# Patient Record
Sex: MALE | Race: BLACK OR AFRICAN AMERICAN | NOT HISPANIC OR LATINO | Employment: FULL TIME | URBAN - METROPOLITAN AREA
[De-identification: names, ages, dates, MRNs, and addresses within clinical notes are randomized per-mention and may not be internally consistent; named-entity substitution may affect disease eponyms.]

---

## 2022-03-29 ENCOUNTER — HISTORICAL (OUTPATIENT)
Dept: ADMINISTRATIVE | Facility: HOSPITAL | Age: 47
End: 2022-03-29

## 2022-03-29 ENCOUNTER — HOSPITAL ENCOUNTER (OUTPATIENT)
Dept: MEDSURG UNIT | Facility: HOSPITAL | Age: 47
End: 2022-03-31
Attending: INTERNAL MEDICINE | Admitting: INTERNAL MEDICINE

## 2022-03-29 LAB
ABS NEUT (OLG): 4.55 (ref 2.1–9.2)
ALBUMIN SERPL-MCNC: 4.2 G/DL (ref 3.5–5)
ALBUMIN/GLOB SERPL: 1 {RATIO} (ref 1.1–2)
ALP SERPL-CCNC: 71 U/L (ref 40–150)
ALT SERPL-CCNC: 37 U/L (ref 0–55)
APTT PPP: 24.1 S (ref 23.3–37)
AST SERPL-CCNC: 34 U/L (ref 5–34)
BASOPHILS # BLD AUTO: 0 10*3/UL (ref 0–0.2)
BASOPHILS NFR BLD AUTO: 0 %
BILIRUB SERPL-MCNC: 0.9 MG/DL
BILIRUBIN DIRECT+TOT PNL SERPL-MCNC: 0.4 (ref 0–0.5)
BILIRUBIN DIRECT+TOT PNL SERPL-MCNC: 0.5 (ref 0–0.8)
BUN SERPL-MCNC: 14.1 MG/DL (ref 8.9–20.6)
CALCIUM SERPL-MCNC: 9.7 MG/DL (ref 8.7–10.5)
CHLORIDE SERPL-SCNC: 101 MMOL/L (ref 98–107)
CO2 SERPL-SCNC: 23 MMOL/L (ref 22–29)
CREAT SERPL-MCNC: 1.27 MG/DL (ref 0.73–1.18)
EOSINOPHIL # BLD AUTO: 0 10*3/UL (ref 0–0.9)
EOSINOPHIL NFR BLD AUTO: 0 %
ERYTHROCYTE [DISTWIDTH] IN BLOOD BY AUTOMATED COUNT: 14.1 % (ref 11.5–14.5)
ETHANOL SERPL-MCNC: <10 MG/DL
FLAG2 (OHS): 80
FLAG3 (OHS): 90
FLAGS (OHS): 70
GLOBULIN SER-MCNC: 4.1 G/DL (ref 2.4–3.5)
GLUCOSE SERPL-MCNC: 169 MG/DL (ref 74–100)
HCT VFR BLD AUTO: 39.2 % (ref 40–51)
HGB BLD-MCNC: 13.3 G/DL (ref 13.5–17.5)
ICTERIC INTERF INDEX SERPL-ACNC: 1
IMM GRANULOCYTES # BLD AUTO: 0.01 10*3/UL
IMM GRANULOCYTES NFR BLD AUTO: 0 %
IMM. NE 2 SUSPECT FLAG (OHS): 20
INR PPP: 0.98 (ref 0.9–1.2)
LDH SERPL-CCNC: 244 U/L (ref 140–271)
LIPASE SERPL-CCNC: 479 U/L
LIPEMIC INTERF INDEX SERPL-ACNC: 3
LOW EVENT # SUSPECT FLAG (OHS): 70
LYMPHOCYTES # BLD AUTO: 1.7 10*3/UL (ref 0.6–4.6)
LYMPHOCYTES NFR BLD AUTO: 25 %
MAGNESIUM SERPL-MCNC: 2.3 MG/DL (ref 1.6–2.6)
MANUAL DIFF? (OHS): NO
MCH RBC QN AUTO: 32.4 PG (ref 26–34)
MCHC RBC AUTO-ENTMCNC: 33.9 G/DL (ref 31–37)
MCV RBC AUTO: 95.4 FL (ref 80–100)
MO BLASTS SUSPECT FLAG (OHS): 40
MONOCYTES # BLD AUTO: 0.6 10*3/UL (ref 0.1–1.3)
MONOCYTES NFR BLD AUTO: 9 %
NEUTROPHILS # BLD AUTO: 4.55 10*3/UL (ref 2.1–9.2)
NEUTROPHILS NFR BLD AUTO: 66 %
NRBC BLD AUTO-RTO: 0 % (ref 0–0.2)
PHOSPHATE SERPL-MCNC: 3.9 MG/DL (ref 2.3–4.7)
PLATELET # BLD AUTO: 178 10*3/UL (ref 130–400)
PLATELET CLUMPS SUSPECT FLAG (OHS): 40
PMV BLD AUTO: 10.5 FL (ref 7.4–10.4)
POTASSIUM SERPL-SCNC: 4.3 MMOL/L (ref 3.5–5.1)
PROT SERPL-MCNC: 8.3 G/DL (ref 6.4–8.3)
PROTHROMBIN TIME: 12.8 S (ref 11.9–14.4)
RBC # BLD AUTO: 4.11 10*6/UL (ref 4.5–5.9)
SODIUM SERPL-SCNC: 138 MMOL/L (ref 136–145)
WBC # SPEC AUTO: 6.9 10*3/UL (ref 4.5–11)

## 2022-03-30 LAB
ABS NEUT (OLG): 3.35 (ref 2.1–9.2)
ALBUMIN SERPL-MCNC: 3.4 G/DL (ref 3.5–5)
ALBUMIN/GLOB SERPL: 1 {RATIO} (ref 1.1–2)
ALP SERPL-CCNC: 57 U/L (ref 40–150)
ALT SERPL-CCNC: 27 U/L (ref 0–55)
AMPHET UR QL SCN: NEGATIVE
APPEARANCE, UA: CLEAR
AST SERPL-CCNC: 27 U/L (ref 5–34)
BACTERIA SPEC CULT: NORMAL
BARBITURATE SCN PRESENT UR: NEGATIVE
BASOPHILS # BLD AUTO: 0 10*3/UL (ref 0–0.2)
BASOPHILS NFR BLD AUTO: 0 %
BENZODIAZ UR QL SCN: NEGATIVE
BILIRUB SERPL-MCNC: 0.9 MG/DL
BILIRUB UR QL STRIP: NEGATIVE
BILIRUBIN DIRECT+TOT PNL SERPL-MCNC: 0.4 (ref 0–0.5)
BILIRUBIN DIRECT+TOT PNL SERPL-MCNC: 0.5 (ref 0–0.8)
BUN SERPL-MCNC: 11.5 MG/DL (ref 8.9–20.6)
CALCIUM SERPL-MCNC: 8.6 MG/DL (ref 8.7–10.5)
CANNABINOIDS UR QL SCN: POSITIVE
CHLORIDE SERPL-SCNC: 105 MMOL/L (ref 98–107)
CHOLEST SERPL-MCNC: 161 MG/DL
CHOLEST/HDLC SERPL: 3 {RATIO} (ref 0–5)
CO2 SERPL-SCNC: 24 MMOL/L (ref 22–29)
COCAINE UR QL SCN: NEGATIVE
COLOR UR: NORMAL
CREAT SERPL-MCNC: 0.87 MG/DL (ref 0.73–1.18)
EOSINOPHIL # BLD AUTO: 0.1 10*3/UL (ref 0–0.9)
EOSINOPHIL NFR BLD AUTO: 1 %
ERYTHROCYTE [DISTWIDTH] IN BLOOD BY AUTOMATED COUNT: 14.2 % (ref 11.5–14.5)
FENTANYL UR QL SCN: NEGATIVE
FLAG2 (OHS): 80
FLAG3 (OHS): 80
FLAGS (OHS): 70
GLOBULIN SER-MCNC: 3.3 G/DL (ref 2.4–3.5)
GLUCOSE (UA): NORMAL
GLUCOSE SERPL-MCNC: 107 MG/DL (ref 74–100)
HCT VFR BLD AUTO: 34.2 % (ref 40–51)
HDLC SERPL-MCNC: 56 MG/DL (ref 35–60)
HEMOLYSIS INTERF INDEX SERPL-ACNC: 6
HEMOLYSIS INTERF INDEX SERPL-ACNC: 9
HGB BLD-MCNC: 11.4 G/DL (ref 13.5–17.5)
HGB UR QL STRIP: NEGATIVE
HYALINE CASTS #/AREA URNS LPF: NORMAL /[LPF]
ICTERIC INTERF INDEX SERPL-ACNC: 1
IMM GRANULOCYTES # BLD AUTO: 0.01 10*3/UL
IMM GRANULOCYTES NFR BLD AUTO: 0 %
IMM. NE 1 SUSPECT FLAG (OHS): 10
IMM. NE 2 SUSPECT FLAG (OHS): 20
KETONES UR QL STRIP: NEGATIVE
LDLC SERPL CALC-MCNC: 89 MG/DL (ref 50–140)
LEUKOCYTE ESTERASE UR QL STRIP: NEGATIVE
LOW EVENT # SUSPECT FLAG (OHS): 70
LYMPHOCYTES # BLD AUTO: 2.4 10*3/UL (ref 0.6–4.6)
LYMPHOCYTES NFR BLD AUTO: 37 %
MAGNESIUM SERPL-MCNC: 2.2 MG/DL (ref 1.6–2.6)
MANUAL DIFF? (OHS): NO
MCH RBC QN AUTO: 32.8 PG (ref 26–34)
MCHC RBC AUTO-ENTMCNC: 33.3 G/DL (ref 31–37)
MCV RBC AUTO: 98.3 FL (ref 80–100)
MDMA UR QL SCN: NEGATIVE
MO BLASTS SUSPECT FLAG (OHS): 40
MONOCYTES # BLD AUTO: 0.6 10*3/UL (ref 0.1–1.3)
MONOCYTES NFR BLD AUTO: 10 %
MUCOUS THREADS URNS QL MICRO: NORMAL
NEUTROPHILS # BLD AUTO: 3.35 10*3/UL (ref 2.1–9.2)
NEUTROPHILS NFR BLD AUTO: 52 %
NITRITE UR QL STRIP: NEGATIVE
NRBC BLD AUTO-RTO: 0 % (ref 0–0.2)
OPIATES UR QL SCN: POSITIVE
PCP UR QL: NEGATIVE
PH UR STRIP.AUTO: 6.5 [PH] (ref 3–11)
PH UR STRIP: 5.5 [PH] (ref 4.5–8)
PHOSPHATE SERPL-MCNC: 3.6 MG/DL (ref 2.3–4.7)
PLATELET # BLD AUTO: 151 10*3/UL (ref 130–400)
PLATELET CLUMPS SUSPECT FLAG (OHS): 20
PMV BLD AUTO: 10.8 FL (ref 7.4–10.4)
POTASSIUM SERPL-SCNC: 3.8 MMOL/L (ref 3.5–5.1)
PROT SERPL-MCNC: 6.7 G/DL (ref 6.4–8.3)
PROT UR QL STRIP: NORMAL
RBC # BLD AUTO: 3.48 10*6/UL (ref 4.5–5.9)
RBC #/AREA URNS HPF: NORMAL /[HPF]
SODIUM SERPL-SCNC: 139 MMOL/L (ref 136–145)
SP GR UR STRIP: 1.01 (ref 1–1.03)
SQUAMOUS EPITHELIAL, UA: NORMAL
TRIGL SERPL-MCNC: 78 MG/DL (ref 34–140)
UROBILINOGEN UR STRIP-ACNC: NORMAL
VLDLC SERPL CALC-MCNC: 16 MG/DL
WBC # SPEC AUTO: 6.5 10*3/UL (ref 4.5–11)
WBC #/AREA URNS HPF: NORMAL /[HPF] (ref 0–5)

## 2022-03-31 LAB
ABS NEUT (OLG): 3.61 (ref 2.1–9.2)
ALBUMIN SERPL-MCNC: 3.4 G/DL (ref 3.5–5)
ALBUMIN/GLOB SERPL: 1 {RATIO} (ref 1.1–2)
ALP SERPL-CCNC: 61 U/L (ref 40–150)
ALT SERPL-CCNC: 23 U/L (ref 0–55)
AST SERPL-CCNC: 25 U/L (ref 5–34)
BASOPHILS # BLD AUTO: 0 10*3/UL (ref 0–0.2)
BASOPHILS NFR BLD AUTO: 0 %
BILIRUB SERPL-MCNC: 1 MG/DL
BILIRUBIN DIRECT+TOT PNL SERPL-MCNC: 0.4 (ref 0–0.5)
BILIRUBIN DIRECT+TOT PNL SERPL-MCNC: 0.6 (ref 0–0.8)
BUN SERPL-MCNC: 4.2 MG/DL (ref 8.9–20.6)
CALCIUM SERPL-MCNC: 9.3 MG/DL (ref 8.7–10.5)
CHLORIDE SERPL-SCNC: 102 MMOL/L (ref 98–107)
CO2 SERPL-SCNC: 27 MMOL/L (ref 22–29)
CREAT SERPL-MCNC: 0.76 MG/DL (ref 0.73–1.18)
EOSINOPHIL # BLD AUTO: 0.1 10*3/UL (ref 0–0.9)
EOSINOPHIL NFR BLD AUTO: 2 %
ERYTHROCYTE [DISTWIDTH] IN BLOOD BY AUTOMATED COUNT: 13.7 % (ref 11.5–14.5)
FERRITIN SERPL-MCNC: 340.48 NG/ML (ref 21.81–274.66)
FLAG2 (OHS): 80
FLAG3 (OHS): 80
FLAGS (OHS): 70
GLOBULIN SER-MCNC: 3.5 G/DL (ref 2.4–3.5)
GLUCOSE SERPL-MCNC: 95 MG/DL (ref 74–100)
HCT VFR BLD AUTO: 35.5 % (ref 40–51)
HEMOLYSIS INTERF INDEX SERPL-ACNC: 2
HGB BLD-MCNC: 11.9 G/DL (ref 13.5–17.5)
ICTERIC INTERF INDEX SERPL-ACNC: 1
IMM GRANULOCYTES # BLD AUTO: 0.01 10*3/UL
IMM GRANULOCYTES NFR BLD AUTO: 0 %
IMM. NE 1 SUSPECT FLAG (OHS): 10
IMM. NE 2 SUSPECT FLAG (OHS): 10
IRON SATN MFR SERPL: 28 % (ref 20–50)
IRON SERPL-MCNC: 55 UG/DL (ref 65–175)
LOW EVENT # SUSPECT FLAG (OHS): 70
LYMPHOCYTES # BLD AUTO: 2.4 10*3/UL (ref 0.6–4.6)
LYMPHOCYTES NFR BLD AUTO: 35 %
MANUAL DIFF? (OHS): NO
MCH RBC QN AUTO: 32.4 PG (ref 26–34)
MCHC RBC AUTO-ENTMCNC: 33.5 G/DL (ref 31–37)
MCV RBC AUTO: 96.7 FL (ref 80–100)
MO BLASTS SUSPECT FLAG (OHS): 40
MONOCYTES # BLD AUTO: 0.7 10*3/UL (ref 0.1–1.3)
MONOCYTES NFR BLD AUTO: 10 %
NEUTROPHILS # BLD AUTO: 3.61 10*3/UL (ref 2.1–9.2)
NEUTROPHILS NFR BLD AUTO: 52 %
NRBC BLD AUTO-RTO: 0 % (ref 0–0.2)
PLATELET # BLD AUTO: 156 10*3/UL (ref 130–400)
PLATELET CLUMPS SUSPECT FLAG (OHS): 10
PMV BLD AUTO: 11.1 FL (ref 7.4–10.4)
POTASSIUM SERPL-SCNC: 3.6 MMOL/L (ref 3.5–5.1)
PROT SERPL-MCNC: 6.9 G/DL (ref 6.4–8.3)
RBC # BLD AUTO: 3.67 10*6/UL (ref 4.5–5.9)
SODIUM SERPL-SCNC: 140 MMOL/L (ref 136–145)
TIBC SERPL-MCNC: 144 UG/DL (ref 69–240)
TIBC SERPL-MCNC: 199 UG/DL (ref 250–450)
TRANSFERRIN SERPL-MCNC: 177 MG/DL (ref 174–364)
WBC # SPEC AUTO: 6.9 10*3/UL (ref 4.5–11)

## 2022-04-10 ENCOUNTER — HISTORICAL (OUTPATIENT)
Dept: ADMINISTRATIVE | Facility: HOSPITAL | Age: 47
End: 2022-04-10

## 2022-04-25 VITALS — DIASTOLIC BLOOD PRESSURE: 81 MMHG | OXYGEN SATURATION: 100 % | WEIGHT: 159.38 LBS | SYSTOLIC BLOOD PRESSURE: 129 MMHG

## 2022-05-14 NOTE — ED PROVIDER NOTES
Patient:   Herminio Graves            MRN: [id number]            FIN: [id number]               Age:   46 years     Sex:  Male     :  1975   Associated Diagnoses:   Chronic alcoholic pancreatitis   Author:   Igor Paris MD      Basic Information   Time seen: Immediately upon arrival.   History source: Patient.   Arrival mode: Private vehicle.   History limitation: None.   Provider/Visit info:   Time Seen:  Igor Paris MD / 2022 22:13  .   History of Present Illness   The patient presents with abdominal pain.  The onset was 2  days ago.  The course/duration of symptoms is constant.  The character of symptoms is achy.  The degree at onset was minimal.  The Location of pain at onset was diffuse, upper and abdominal.  The degree at present is severe.  The Location of pain at present is diffuse, upper and abdominal.  Radiating pain: none. The exacerbating factor is Alcohol use.  The relieving factor is none.  Therapy today: none.  Risk factors consist of ETOH abuse, recurrent pancreatitis.  Associated symptoms: nausea, vomiting, denies chest pain, denies diarrhea, denies shortness of breath and denies fever.  Additional history: sexual history: non-contributory.        Review of Systems   Constitutional symptoms:  No fever, no chills, no sweats, no weakness.    Skin symptoms:  No rash, no lesion.    Eye symptoms:  No recent vision problems,    Respiratory symptoms:  No shortness of breath, no orthopnea, no cough, no sputum production.    Cardiovascular symptoms:  No chest pain, no palpitations, no tachycardia, no syncope, no diaphoresis, no peripheral edema.    Gastrointestinal symptoms:  Constipation, no diarrhea, no rectal bleeding.    Genitourinary symptoms:  No dysuria, no hematuria, no testicular pain.    Musculoskeletal symptoms:  No back pain, no Joint pain.    Neurologic symptoms:  No headache, no dizziness.    Psychiatric symptoms:  Substance abuse.    Allergy/immunologic symptoms:  Negative except as documented in HPI.             Additional review of systems information: All other systems reviewed and otherwise negative.      Health Status   Allergies:    Allergic Reactions (Selected)  No Known Allergies,    Allergies (1) Active Reaction  No Known Allergies None Documented  .   Medications:  (Selected)   Inpatient Medications  Ordered  NS (0.9% Sodium Chloride) Infusion: 1,000 mL, 1,000 mL, IV, Once, 500 mL/hr, start date 03/29/22 22:19:00 CDT, stop date 03/29/22 22:19:00 CDT, STAT  Pepcid (for IV Push): 20 mg, form: Injection, IV Slow, Once, first dose 03/29/22 23:00:00 CDT, stop date 03/29/22 23:00:00 CDT  morphine 4 mg/mL preservative-free intravenous solution: 4 mg, form: Injection, IV Push, Once-NOW PRN for pain, severe, first dose 03/29/22 22:20:00 CDT, STAT, ( > 7 on pain scale).      Past Medical/ Family/ Social History   Medical history:    No active or resolved past medical history items have been selected or recorded., Reviewed as documented in chart.   Surgical history:    hernia repair..   Family history:    No family history items have been selected or recorded..   Social history:    Social & Psychosocial Habits    Alcohol  07/05/2016  Use: Current    Type: Beer    Frequency: 1-2 times per week    Substance Use  09/10/2021  Use: Current    Type: Marijuana    Frequency: 3-5 times per week    Tobacco  09/22/2021  Use: 10 or more cigarettes (1/    Patient Wants Consult For Cessation Counseling No    Abuse/Neglect  09/10/2021  SHX Any signs of abuse or neglect No    Feels unsafe at home: No    Safe place to go: Yes  , Alcohol use: Regularly, Tobacco use: Regularly, Drug use: Marijuana.   Problem list:    Active Problems (5)  Deficient knowledge of symptom   Knowledge deficit   Malnutrition   No chronic problems   Tobacco user   .      Physical Examination               Vital Signs      No qualifying data available.   Oxygen saturation.   General:   Alert, mild distress.    Skin:  Warm, dry, pink, intact, no pallor, no rash.    Head:  Normocephalic, atraumatic.    Neck:  Supple, trachea midline, no JVD, no carotid bruit.    Eye:  Pupils are equal, round and reactive to light, extraocular movements are intact, normal conjunctiva.    Ears, nose, mouth and throat:  Oral mucosa moist.   Cardiovascular:  Regular rate and rhythm, No murmur, Normal peripheral perfusion, No edema.    Respiratory:  Lungs are clear to auscultation, respirations are non-labored, breath sounds are equal, Symmetrical chest wall expansion.    Gastrointestinal:  Soft, Non distended, No organomegaly, Tenderness: Mild, generalized, epigastric, Guarding: Negative, Rebound: Negative, Bowel sounds: Normal.    Back:  Nontender, Normal range of motion.    Musculoskeletal:  Normal ROM, normal strength, no swelling.    Neurological:  Alert and oriented to person, place, time, and situation, No focal neurological deficit observed, normal motor observed, normal speech observed, normal coordination observed.    Psychiatric:  Cooperative, appropriate mood & affect.       Medical Decision Making   Rationale:  Irvin Criteria    Age > 55:  WBC > 16,000:  Glu > 200:  LDH > 350:  AST > 250:    Total: 0.   Documents reviewed:  Emergency department nurses' notes, emergency department records, prior records, IMPRESSION:  1. Findings consistent with acute edematous pancreatitis. No definite  pancreatic parenchymal perfusion defects to suggest necrosis. No acute  peripancreatic fluid collections. No peripancreatic ductal dilatation.     2. Reactive wall thickening and inflammation of the duodenum  compatible with secondary duodenitis.     3. Circumferential wall thickening of the moderately distended urinary  bladder, greater than expected for the degree of underdistention.  Correlation with urinalysis is suggested for exclusion of superimposed  cystitis.     4. Additional nonacute and incidental secondary  findings, as detailed  above.       Signature Line  Electronically Signed By: Raul Alexander MD  Date/Time Signed: 09/10/2021 15:30.    Electrocardiogram:  Time 3/29/2022 23:09:00, rate 78, normal sinus rhythm, No ST-T changes, no ectopy, normal NV & QRS intervals, EP Interp.    Results review:  Lab results : [facility]   3/29/2022 22:29 CDT      WBC                       6.9 x10(3)/mcL                             Hgb                       13.3 gm/dL  LOW                             Hct                       39.2 %  LOW                             Platelet                  178 x10(3)/mcL                             Neutro Auto               66 %  NA                             Lymph Auto                25 %  NA                             Mono Auto                 9 %  NA  , Lab results : [facility]   3/29/2022 22:29 CDT      LDH                       244 unit/L  , Lab results : [facility]   3/29/2022 22:29 CDT      Sodium Lvl                138 mmol/L                             Potassium Lvl             4.3 mmol/L                             Chloride                  101 mmol/L                             CO2                       23 mmol/L                             Calcium Lvl               9.7 mg/dL                             Magnesium Lvl             2.30 mg/dL                             Glucose Lvl               169 mg/dL  HI                             BUN                       14.1 mg/dL                             Creatinine                1.27 mg/dL  HI                             Bili Total                0.9 mg/dL                             AST                       34 unit/L                             ALT                       37 unit/L                             Alk Phos                  71 unit/L                             LDH                       244 unit/L                             Lipase Lvl                479 U/L  HI                             Ethanol Lvl               <10.0 mg/dL  NA   ,    No qualifying data available.    Chest X-Ray:  Time reported 3/29/2022 23:49:00, no acute disease process, interpretation by Emergency Physician.    Abdominal/KUB X-ray  Nonspecific bowel gas pattern, interpretation by Emergency Physician.       Reexamination/ Reevaluation   Time: 3/29/2022 23:55:00 .   Assessment: exam unchanged.      Impression and Plan   Diagnosis   Chronic alcoholic pancreatitis ([id number] K86.0)      Calls-Consults   -  3/29/2022 23:56:00 , Medicine, consult.    Plan   Condition: Stable.    Disposition: Admit time  3/29/2022 23:56:00, Place in Observation Unit.    Counseled: Patient, Regarding diagnosis, Regarding diagnostic results, Regarding treatment plan, Patient indicated understanding of instructions.

## 2022-05-14 NOTE — DISCHARGE SUMMARY
Admit and Discharge Dates  Admit Date: 03/29/2022  Discharge Date: 03/31/2022  Physicians  Attending Physician - Ananya Hood MD  Admitting Physician - Ananya Hood MD  Primary Care Physician - No PCP, No  Discharge Diagnosis  Acute on chronic pancreatitis  Elevated BP  ETOH use  Tobacco use  Anemia  Surgical Procedures  No procedures recorded for this visit.  Immunizations  No immunizations recorded for this visit.  Admission Information  46-year-old male with a history of significant past medical history who presents to the ED with complaint of?nausea, vomiting, and abdominal pain for 3 days. ?Patient describes his?abdominal pain as pulling?pain in epigastric region, alleviated by nothing, aggravated by food, associated with?nausea?and nonbloody nonbilious vomiting?and weakness. ?Patient states that this weekend he had a party?where he had a few beers. ?Patient states he had?a previous episode about a month ago. ?Of note?patient was admitted at [facility]?for acute pancreatitis and?9/2021.? She normally drinks about 3?to 4 16 ounce beers?daily?for the past 3 years. ED course: On initial presentation vital signs are stable.? Awaiting the ED he became hypertensive with systolics up to 180 and diastolic up to 112.? Notable labs glucose 169, BUN 14.1, creatinine 1.27, base 479, H/H 13.3/39.2, MCV 95.4.? Urinalysis unremarkable.? UDS positive for cannabis and opiates.? EKG unremarkable.? He received a dose of Toradol and morphine.? Internal medicine was consulted for acute pancreatitis.  Hospital Course  This patient initially presented to the ED on 3/30/2022 d/t N/V and abdominal pain x3days. CXR and abdominal Xray were normal at that time; he was given aggressive IVF and pain meds. Thiamine, multivitamin and CIWA protocol were also started d/t his HX of alcoholism.?On 3/31/2022, he was tolerating PO intake and stated that his pain had reduced significantly. Patient was stable to be discharged. Discharge plan is as follows:  [facility] with Dr. Efren Ozuna in 1 week.  Significant Findings  3/29/2022 - CXR WNL  3/29/2022 - Abdominal Xray shows non-obstructed bowel gas  3/30/2022 - Retroperitoneum U/S shows no gallstones,?mild extrahepatic biliary ductal dilatation,?suspect hepatic steatosis.  Time Spent on discharge  ~35 minutes  Objective  Vitals & Measurements  T:?36.9? ?C (Oral)? TMIN:?36.8? ?C (Oral)? TMAX:?37.2? ?C (Oral)? HR:?81(Peripheral)? RR:?18? BP:?130/83? SpO2:?98%?  Physical Exam  Please refer to progress note  Patient Discharge Condition  Hemodynamically and physically stable  Discharge Disposition  Mr.Corey Graves?is being discharged?home.  ?   Activity:?Return to normal activity. Recommend returning to?workin _?days.  Diet:?Regular Diet  ?  Medications:  -Rx provided for?Folic acid 1mg PO daily?for?30 days?  -Rx provided for Thiamine 100mg PO daily for 30 days  ?   Follow Ups:  -To be seen in [facility] with [facility]?by ?Efren Ozuna?in?1 week  ?  The above information was discussed with?the patient?in clear terms.?He?was?able to repeat the instructions to me in?hisown words. All questions answered. ED precautions provided.?  ?   Attending Physician Addendum  Pt seen and Discussed with medicine residents on morning rounds  Resting this morning  Electrolytes improving steadily  Agree with above discharge plan   Discharge Medication Reconciliation  Prescribed  folic acid (folic acid 1 mg oral tablet)?1 mg, Oral, Daily  thiamine (thiamine 100 mg oral tablet)?100 mg, Oral, Daily  Education and Orders Provided  Acute Pancreatitis, Easy-to-Read  Pancreatitis Eating Plan  Discharge - 03/31/22 11:20:00 CDT, Home, Discharge after meds to bed. Thank you!?  Follow up  [facility]  ????Office will call w/follow up appointment

## 2022-05-21 NOTE — HISTORICAL [facility] CERNER
This is a historical note converted from Scar. Formatting and pictures may have been removed.  Please reference Scar for original formatting and attached multimedia. Chief Complaint  Pt presents to ed with reports of ULQ abd pain starting earlier today. Pt reports pancreatitis in the past states he believes this is a flare-up.  History of Present Illness  46-year-old male with a history of significant past medical history who presents to the ED with complaint of?nausea, vomiting, and abdominal pain for 3 days. ?Patient describes his?abdominal pain as pulling?pain in epigastric region, alleviated by nothing, aggravated by food, associated with?nausea?and nonbloody nonbilious vomiting?and weakness. ?Patient states that this weekend he had a party?where he had a few beers. ?Patient states he had?a previous episode about a month ago. ?Of note?patient was admitted at [facility]?for acute pancreatitis and?2021.? She normally drinks about 3?to 4 16 ounce beers?daily?for the past 3 years.  ?  ED course: On initial presentation vital signs are stable.? Awaiting the ED he became hypertensive with systolics up to 180 and diastolic up to 112.? Notable labs glucose 169, BUN 14.1, creatinine 1.27, base 479, H/H 13.3/39.2, MCV 95.4.? Urinalysis unremarkable.? UDS positive for cannabis and opiates.? EKG unremarkable.? He received a dose of Toradol and morphine.? Internal medicine was consulted for acute pancreatitis.  ?  PMHx: Denies  PSurgHx: Hernia repair  Family Hx: Father  from pancreatitis cancer, Aunts  from breast cancer, Paternal grandmother  from pancreatic cancer  Social Hx:  ????? Alcohol:?Stopped for about a month. 3-4 beers a day x 3 years  ????? Smoking:?Every blue moon 2-3 cigs/ day  ????? Drug use:?denies  ????? Lives with: with girlfriend and her mother in [place]  Allergies: NKDA  Medications: None  Review of Systems  Constitutional:?No fever, chills  HENT:?Denies headaches,  lightheadedness  Eyes:?Denies vision changes  Cardiovascular:?Denies chest pain,?palpitations  Respiratory:?Denies cough, shortness of breath, pain with breathing  :?Denies?dysuria,?urinary frequency,?urinary hesitancy, foul-smelling urine  MSK:?Denies weakness, pain  Skin:?Denies rashes, trauma  Neuro:?Denies numbness, tingling, focal deficits  Heme:?Denies bleeding?in?urine, stool, from any other sources  Physical Exam  Vitals & Measurements  T:?37.1? ?C (Oral)? TMIN:?36.6? ?C (Oral)? TMAX:?37.1? ?C (Oral)? HR:?85(Peripheral)? HR:?85(Monitored)? RR:?20? BP:?157/99? SpO2:?100%?  General: ?Alert and oriented, No acute distress.??  Appearance:? wearing face mask.  HEENT: ?Normocephalic, dry mucous membranes.?Pupils are equal, round and reactive to light, Extraocular movements are intact  Neck: ?Supple, Non-tender, No lymphadenopathy, No thyromegaly.??  Respiratory: ?Lungs are clear to auscultation, Respirations are non-labored, Breath sounds are equal, Symmetrical chest wall expansion.??  Cardiovascular: ?Normal rate, Regular rhythm, No edema, brisk capillary refill.??  Gastrointestinal:Soft, nondistended,?diffuse?abdominal tenderness, voluntary guarding,?no rebound.??  Musculoskeletal: Symmetric?movement of all extremities with no weakness appreciated  Integumentary: ?Warm, dry, intact.??  Neurologic:? No focal deficits, Cranial Nerves II-XII are grossly intact.??  Cognition and Speech: ?Oriented, Speech clear and coherent.??  Psychiatric: ?Cooperative, Appropriate mood & affect.??  Assessment/Plan  Acute on Chronic?Pancreatitis secondary to alcohol intake  Ransons criteria 0  Lipase >5x the upper limit of normal at 479  Abdominal US pending  Received IL NS in the ED. Will continue IVF with?LR at 250ml/hr  Acetaminophen?at 1000 mg?q6h prn pain?and?morphine?2mg q2h prn for severe pain  If patient fails to improve, consider CT Abdomen and Pelvis  ?  GENOVEVA  Patient appears to be dry  Expect to improve with IV  hydration  Avoid nephrotoxic medications  Follow up CMP in am  ?  Elevated BP  Patient denies history of HTN  Most likely secondary to pain  Will control with Hydralazine and Labetalol PRN  Continue to monitor  ?   Alcohol use disorder  [facility] protocol initiated  Thiamine, Folate, MVI  Monitor for signs of withdrawal  ?   ?Polysubstance abuse  UDS collected after patient received Morphine in ED?  Admits to marijuana use  ?   Normocytic Anemia  Steady decline in H/H?since 2021  Patient denies any signs of bleeding  This could be as a result of?alcohol use  Consider Iron?panel  ?  Dispo:?Admit to Telemetry as observation for IVF and pain control  ?  DVT Ppx: Heparin SQ  GI Ppx: None  Lines:?PIV  Drains:?None  Diet:?NPO  Consults:?None  ?   Enriqueta Najera MD  [facility], [facility]  ---------------------------------------------------------------  PGY II?resident addendum.  This is a 46-year-old male with past medical history as delineated above?who comes in complaining of nausea, vomiting, abdominal pain x3 days.? Pain localized to the epigastric region with no alleviating factors.? Rest of history and course as delineated above.  Patient seen and examined with intern, including the formulation of the assessment and plan.? All lab findings, images and chart documentation were reviewed.?  Agree with above assessment and plan.?  -Patient admitted?for recurrent?pancreatitis.? Will keep n.p.o. for now?and continue IV hydration. ?Advance diet as tolerated.? UDS pending. ?Continue to monitor kidney functions?and clinical picture.   Problem List/Past Medical History  Ongoing  No chronic problems  Tobacco user  Historical  No qualifying data  Procedure/Surgical History  hernia repair   Medications  Inpatient  chlordiazePOXIDE, 25 mg= 1 cap(s), Oral, q2hr, PRN  folic acid 1 mg oral tablet, 1 mg= 1 tab(s), Oral, Daily  heparin, 5000 units= 1 mL, Subcutaneous, q12hr  hydrALAZINE (Apresoline) Inj., 10 mg= 0.5 mL, IV Push,  q2hr, PRN  labetalol 5 mg/mL intravenous solution, 10 mg= 2 mL, IV Push, q2hr, PRN  Lactated Ringers 1000ml 1,000 mL, 1000 mL, IV  LORAzepam, 1 mg= 0.5 mL, IV Push, q2hr, PRN  LORAzepam, 2 mg= 1 mL, IV Push, q1hr, PRN  morphine 2 mg/mL preservative-free intravenous solution, 2 mg= 1 mL, IV, q2hr, PRN  MVI with Minerals (Adult Tab), 1 tab(s), Oral, Daily  thiamine, 100 mg= 1 tab(s), Oral, Daily  Home  No active home medications  Allergies  No Known Allergies  Social History  Abuse/Neglect  No, No, Yes, 03/29/2022  No, No, Yes, 09/10/2021  Alcohol  Current, Beer, 1-2 times per week, 07/05/2016  Substance Use  Current, Marijuana, 3-5 times per week, 09/10/2021  Tobacco  10 or more cigarettes (1/2 pack or more)/day in last 30 days, No, 03/29/2022  10 or more cigarettes (1/2 pack or more)/day in last 30 days, No, 09/22/2021  Lab Results  Labs Last 24 Hours?  ?Chemistry? Hematology/Coagulation?   Sodium Lvl: 139 mmol/L (03/30/22 05:07:00) WBC: 6.5 x10(3)/mcL (03/30/22 05:07:00)   Potassium Lvl: 3.8 mmol/L (03/30/22 05:07:00) RBC:?3.48 x10(6)/mcL?Low (03/30/22 05:07:00)   Chloride: 105 mmol/L (03/30/22 05:07:00) Hgb:?11.4 gm/dL?Low (03/30/22 05:07:00)   CO2: 24 mmol/L (03/30/22 05:07:00) Hct:?34.2 %?Low (03/30/22 05:07:00)   Calcium Lvl:?8.6 mg/dL?Low (03/30/22 05:07:00) Platelet: 151 x10(3)/mcL (03/30/22 05:07:00)   Magnesium Lvl: 2.3 mg/dL (03/29/22 22:29:00) MCV: 98.3 fL (03/30/22 05:07:00)   Glucose Lvl:?107 mg/dL?High (03/30/22 05:07:00) MCH: 32.8 pg (03/30/22 05:07:00)   BUN: 11.5 mg/dL (03/30/22 05:07:00) MCHC: 33.3 gm/dL (03/30/22 05:07:00)   Creatinine: 0.87 mg/dL (03/30/22 05:07:00) RDW: 14.2 % (03/30/22 05:07:00)   Est Creat Clearance Ser: 105.49 mL/min (03/30/22 06:48:47) MPV:?10.8 fL?High (03/30/22 05:07:00)   eGFR-AA: >105 (03/30/22 05:07:00) Abs Neut: 3.35 x10(3)/mcL (03/30/22 05:07:00)   eGFR-JAZZY: 101 mL/min/1.73 m2 (03/30/22 05:07:00) Neutro Auto: 52 % (03/30/22 05:07:00)   Bili Total: 0.9 mg/dL (03/30/22  05:07:00) Lymph Auto: 37 % (03/30/22 05:07:00)   Bili Direct: 0.4 mg/dL (03/30/22 05:07:00) Mono Auto: 10 % (03/30/22 05:07:00)   Bili Indirect: 0.5 mg/dL (03/30/22 05:07:00) Eos Auto: 1 % (03/30/22 05:07:00)   AST: 27 unit/L (03/30/22 05:07:00) Abs Eos: 0.1 x10(3)/mcL (03/30/22 05:07:00)   ALT: 27 unit/L (03/30/22 05:07:00) Basophil Auto: 0 % (03/30/22 05:07:00)   Alk Phos: 57 unit/L (03/30/22 05:07:00) Abs Neutro: 3.35 x10(3)/mcL (03/30/22 05:07:00)   Total Protein: 6.7 gm/dL (03/30/22 05:07:00) Abs Lymph: 2.4 x10(3)/mcL (03/30/22 05:07:00)   Albumin Lvl:?3.4 gm/dL?Low (03/30/22 05:07:00) Abs Mono: 0.6 x10(3)/mcL (03/30/22 05:07:00)   Globulin: 3.3 gm/dL (03/30/22 05:07:00) Abs Baso: 0 x10(3)/mcL (03/30/22 05:07:00)   A/G Ratio:?1 ratio?Low (03/30/22 05:07:00) NRBC%: 0.0 (03/30/22 05:07:00)   Phosphorus: 3.9 mg/dL (03/29/22 22:29:00) IG%: 0 % (03/30/22 05:07:00)   LDH: 244 unit/L (03/29/22 22:29:00) IG#: 0.01 (03/30/22 05:07:00)   Lipase Lvl:?479 U/L?High (03/29/22 22:29:00) PT: 12.8 second(s) (03/29/22 22:29:00)   U pH: 6.5 (03/30/22 00:02:00) INR: 0.98 (03/29/22 22:29:00)    PTT: 24.1 second(s) (03/29/22 22:29:00)

## 2022-05-21 NOTE — HISTORICAL [facility]ER
This is a historical note converted from [employer]. Formatting and pictures may have been removed.  Please reference Scar for original formatting and attached multimedia. Subjective  HPI: Herminio Graves?is a?46 Years?yo?Male?w/ PMH of pancreatitis, who presented?to ED?with CC?of abdominal pain x3 days and N/V x 1 day. Patient states he has been having ongoing intermittent?abdominal pain since December 2021 after he started drinking alcohol again (3-4 16oz beers/day)?but the pain became progressively worsened over the past 3 days. The pain is localized in the epigastric area and radiating to his back; it is aggravated by physical movement and nothing seems to alleviate the pain; he had tried OTC Tylenol w/o any relief. Last alcoholic drink was yesterday afternoon. Other accompanying SXS include N/V x 1 day which had subsided this AM. Of note, he was hospitalized in September 2021 for pancreatitis. In the ED: Ethanol level < 10; UDS (+) for opiates and cannabis; lipase 479; patient was hypertensive. NS 1L bolus given; LR @ 250 mL/hr, PRN morphine, PRN labetalol, PRN hydralazine?started.  ?   Today: Patient states epigastric pain is now 3 out of 10, improved significantly compared to the day of admission. He is tolerating PO intake w/o N/V. Denies chest pain, palpitations, SOB, fever, night sweats, chills,?diarrhea, constipation.  Review of Systems  10 point review of systems assessed and are negative except as stated above  Objective  Vitals & Measurements  T:?36.8? ?C (Oral)? TMIN:?36.8? ?C (Oral)? TMAX:?37.2? ?C (Oral)? HR:?63(Peripheral)? BP:?156/89? SpO2:?100%?  Physical Exam  General:?Well nourished w/o distress  HEENT: NC/AT; nasal and oral mucosa moist and clear  Neck: Full?ROM; no lymphadenopathy  Pulm:?CTA bilaterally, normal work of breathing  CV:?S1, S2?w/o murmurs or gallops; no edema noted  GI:?Soft?with normal bowel sounds in all quadrants,?no masses?on palpation, no?guarding  MSK:?Full ROM of all  extremities and spine w/o limitation or discomfort  Derm: No rashes,?abnormal bruising,?or skin lesions  Neuro: AAOx3; motor/sensory function intact  Psych: Cooperative; appropriate mood and affect  Lab Results  Labs Last 24 Hours?  ?Chemistry? Hematology/Coagulation?   Sodium Lvl: 140 mmol/L (03/31/22 05:07:00) WBC: 6.9 x10(3)/mcL (03/31/22 05:07:00)   Potassium Lvl: 3.6 mmol/L (03/31/22 05:07:00) RBC:?3.67 x10(6)/mcL?Low (03/31/22 05:07:00)   Chloride: 102 mmol/L (03/31/22 05:07:00) Hgb:?11.9 gm/dL?Low (03/31/22 05:07:00)   CO2: 27 mmol/L (03/31/22 05:07:00) Hct:?35.5 %?Low (03/31/22 05:07:00)   Calcium Lvl: 9.3 mg/dL (03/31/22 05:07:00) Platelet: 156 x10(3)/mcL (03/31/22 05:07:00)   Glucose Lvl: 95 mg/dL (03/31/22 05:07:00) MCV: 96.7 fL (03/31/22 05:07:00)   BUN:?4.2 mg/dL?Low (03/31/22 05:07:00) MCH: 32.4 pg (03/31/22 05:07:00)   Creatinine: 0.76 mg/dL (03/31/22 05:07:00) MCHC: 33.5 gm/dL (03/31/22 05:07:00)   Est Creat Clearance Ser: 120.76 mL/min (03/31/22 06:39:57) RDW: 13.7 % (03/31/22 05:07:00)   eGFR-AA: >105 (03/31/22 05:07:00) MPV:?11.1 fL?High (03/31/22 05:07:00)   eGFR-JAZZY: >105 (03/31/22 05:07:00) Abs Neut: 3.61 x10(3)/mcL (03/31/22 05:07:00)   Bili Total: 1 mg/dL (03/31/22 05:07:00) Neutro Auto: 52 % (03/31/22 05:07:00)   Bili Direct: 0.4 mg/dL (03/31/22 05:07:00) Lymph Auto: 35 % (03/31/22 05:07:00)   Bili Indirect: 0.6 mg/dL (03/31/22 05:07:00) Mono Auto: 10 % (03/31/22 05:07:00)   AST: 25 unit/L (03/31/22 05:07:00) Eos Auto: 2 % (03/31/22 05:07:00)   ALT: 23 unit/L (03/31/22 05:07:00) Abs Eos: 0.1 x10(3)/mcL (03/31/22 05:07:00)   Alk Phos: 61 unit/L (03/31/22 05:07:00) Basophil Auto: 0 % (03/31/22 05:07:00)   Total Protein: 6.9 gm/dL (03/31/22 05:07:00) Abs Neutro: 3.61 x10(3)/mcL (03/31/22 05:07:00)   Albumin Lvl:?3.4 gm/dL?Low (03/31/22 05:07:00) Abs Lymph: 2.4 x10(3)/mcL (03/31/22 05:07:00)   Globulin: 3.5 gm/dL (03/31/22 05:07:00) Abs Mono: 0.7 x10(3)/mcL (03/31/22 05:07:00)   A/G Ratio:?1  ratio?Low (03/31/22 05:07:00) Abs Baso: 0 x10(3)/mcL (03/31/22 05:07:00)   Iron Lvl:?55 ug/dL?Low (03/31/22 05:07:00) NRBC%: 0.0 (03/31/22 05:07:00)   Transferrin: 177 mg/dL (03/31/22 05:07:00) IG%: 0 % (03/31/22 05:07:00)   TIBC:?199 ug/dL?Low (03/31/22 05:07:00) IG#: 0.01 (03/31/22 05:07:00)   Iron Sat: 28 % (03/31/22 05:07:00)    Ferritin Lvl:?340.48 ng/mL?High (03/31/22 05:07:00)    UIBC: 144 ug/dL (03/31/22 05:07:00)    Diagnostic Results  3/29/2022 - CXR WNL  3/29/2022 - Abdominal Xray shows non-obstructed bowel gas  3/30/2022 - Retroperitoneum U/S shows no gallstones,?mild extrahepatic biliary ductal dilatation,?suspect hepatic steatosis.  Assessment/Plan  Acute on chronic pancreatitis  - Continue clear liquid diet; advance diet as tolerated  -?Continue Morphine PRN  - Continue LR @ 250 mL/hr  - Retroperitoneum U/S shows no gallstones,?mild extrahepatic biliary ductal dilatation,?suspect hepatic steatosis (3/30/2022)  - Lipid panel WNL (3/30/2022)  ?   Elevated BP  - Continue labetalol and hydralazine PRN  ?   ETOH use  - Currently drinks 3-4 beers/day  - Stopped drinking from September-December 2021  - Continue CIWA protocol  ?   Tobacco use  - Recently cut down to 2-3 cigarettes/day  - 1 PPD x 20 years  ?   Anemia  -Steady decline in H/H?since 2021  -Patient denies any signs of bleeding  -Iron studies show high ferritin 340.48 and low iron and TIBC?(3/31/2022)  ?   GENOVEVA - resolved  - Cr 1.27 (3/29/2022); Cr 0.87 (3/30/2022)  - Continue LR @ 250 mL/hr (finished 3L of LR so far)  ?  DVT PPx: Heparin  GI PPx:?None  Diet: NPO, will advance?when pain is controlled  ABX:?None  Fluids: LR @ 250 mL/hr  Pain PRNs: Morphine  O2:?Room air  Code Status: Full  PCP:?None  ?  Disposition:?Continue telemetry monitoring;?IVF and PRN pain meds. Advance clear liquid diet as tolerated. Pain is controlled today and patient states significant SXS improvement compared to the day of admission. Patient is stable to be discharged  today.  ?  Efren Ozuna D.O  [facility] Internal Medicine PGY-1  ?

## 2022-06-15 ENCOUNTER — HOSPITAL ENCOUNTER (EMERGENCY)
Facility: HOSPITAL | Age: 47
Discharge: HOME | End: 2022-06-15
Attending: INTERNAL MEDICINE
Payer: MEDICAID

## 2022-06-15 VITALS
SYSTOLIC BLOOD PRESSURE: 118 MMHG | HEIGHT: 66 IN | WEIGHT: 156.94 LBS | OXYGEN SATURATION: 99 % | RESPIRATION RATE: 18 BRPM | HEART RATE: 100 BPM | TEMPERATURE: 99 F | BODY MASS INDEX: 25.22 KG/M2 | DIASTOLIC BLOOD PRESSURE: 74 MMHG

## 2022-06-15 DIAGNOSIS — S06.0X1A CONCUSSION WITH LOSS OF CONSCIOUSNESS OF 30 MINUTES OR LESS, INITIAL ENCOUNTER: ICD-10-CM

## 2022-06-15 DIAGNOSIS — S02.2XXA CLOSED FRACTURE OF NASAL BONE, INITIAL ENCOUNTER: Primary | ICD-10-CM

## 2022-06-15 PROCEDURE — 99285 EMERGENCY DEPT VISIT HI MDM: CPT | Mod: 25

## 2022-06-15 PROCEDURE — 96372 THER/PROPH/DIAG INJ SC/IM: CPT | Performed by: INTERNAL MEDICINE

## 2022-06-15 PROCEDURE — 63600175 PHARM REV CODE 636 W HCPCS: Performed by: INTERNAL MEDICINE

## 2022-06-15 RX ORDER — NAPROXEN 500 MG/1
500 TABLET ORAL 2 TIMES DAILY WITH MEALS
Qty: 30 TABLET | Refills: 0 | Status: SHIPPED | OUTPATIENT
Start: 2022-06-15 | End: 2022-06-30

## 2022-06-15 RX ORDER — KETOROLAC TROMETHAMINE 30 MG/ML
30 INJECTION, SOLUTION INTRAMUSCULAR; INTRAVENOUS ONCE
Status: COMPLETED | OUTPATIENT
Start: 2022-06-15 | End: 2022-06-15

## 2022-06-15 RX ORDER — CEFUROXIME AXETIL 500 MG/1
500 TABLET ORAL EVERY 12 HOURS
Qty: 20 TABLET | Refills: 0 | Status: SHIPPED | OUTPATIENT
Start: 2022-06-15 | End: 2022-06-25

## 2022-06-15 RX ADMIN — KETOROLAC TROMETHAMINE 30 MG: 30 INJECTION, SOLUTION INTRAMUSCULAR at 11:06

## 2022-06-15 NOTE — Clinical Note
Louise Devi accompanied their family member to the emergency department on 6/15/2022. They may return to work on 06/16/2022.      If you have any questions or concerns, please don't hesitate to call.      Ynes Cohen RN

## 2022-06-15 NOTE — Clinical Note
"Herminio Weston" Ely was seen and treated in our emergency department on 6/15/2022.  He may return to work on 05/20/2022.       If you have any questions or concerns, please don't hesitate to call.      Ynes Cohen RN    "
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
## 2022-06-21 ENCOUNTER — ANESTHESIA EVENT (OUTPATIENT)
Dept: SURGERY | Facility: HOSPITAL | Age: 47
End: 2022-06-21
Payer: MEDICAID

## 2022-06-21 ENCOUNTER — OFFICE VISIT (OUTPATIENT)
Dept: OTOLARYNGOLOGY | Facility: CLINIC | Age: 47
End: 2022-06-21
Payer: MEDICAID

## 2022-06-21 VITALS
HEART RATE: 86 BPM | WEIGHT: 165 LBS | HEIGHT: 68 IN | TEMPERATURE: 98 F | BODY MASS INDEX: 25.01 KG/M2 | SYSTOLIC BLOOD PRESSURE: 124 MMHG | DIASTOLIC BLOOD PRESSURE: 75 MMHG

## 2022-06-21 DIAGNOSIS — J34.89 NASAL OBSTRUCTION: Primary | ICD-10-CM

## 2022-06-21 DIAGNOSIS — S02.2XXA CLOSED FRACTURE OF NASAL BONE, INITIAL ENCOUNTER: ICD-10-CM

## 2022-06-21 PROCEDURE — 3074F PR MOST RECENT SYSTOLIC BLOOD PRESSURE < 130 MM HG: ICD-10-PCS | Mod: CPTII,,, | Performed by: NURSE PRACTITIONER

## 2022-06-21 PROCEDURE — 99204 OFFICE O/P NEW MOD 45 MIN: CPT | Mod: S$PBB,,, | Performed by: NURSE PRACTITIONER

## 2022-06-21 PROCEDURE — 1159F MED LIST DOCD IN RCRD: CPT | Mod: CPTII,,, | Performed by: NURSE PRACTITIONER

## 2022-06-21 PROCEDURE — 3008F PR BODY MASS INDEX (BMI) DOCUMENTED: ICD-10-PCS | Mod: CPTII,,, | Performed by: NURSE PRACTITIONER

## 2022-06-21 PROCEDURE — 3008F BODY MASS INDEX DOCD: CPT | Mod: CPTII,,, | Performed by: NURSE PRACTITIONER

## 2022-06-21 PROCEDURE — 1160F RVW MEDS BY RX/DR IN RCRD: CPT | Mod: CPTII,,, | Performed by: NURSE PRACTITIONER

## 2022-06-21 PROCEDURE — 3074F SYST BP LT 130 MM HG: CPT | Mod: CPTII,,, | Performed by: NURSE PRACTITIONER

## 2022-06-21 PROCEDURE — 3078F PR MOST RECENT DIASTOLIC BLOOD PRESSURE < 80 MM HG: ICD-10-PCS | Mod: CPTII,,, | Performed by: NURSE PRACTITIONER

## 2022-06-21 PROCEDURE — 1159F PR MEDICATION LIST DOCUMENTED IN MEDICAL RECORD: ICD-10-PCS | Mod: CPTII,,, | Performed by: NURSE PRACTITIONER

## 2022-06-21 PROCEDURE — 3078F DIAST BP <80 MM HG: CPT | Mod: CPTII,,, | Performed by: NURSE PRACTITIONER

## 2022-06-21 PROCEDURE — 99204 PR OFFICE/OUTPT VISIT, NEW, LEVL IV, 45-59 MIN: ICD-10-PCS | Mod: S$PBB,,, | Performed by: NURSE PRACTITIONER

## 2022-06-21 PROCEDURE — 99215 OFFICE O/P EST HI 40 MIN: CPT | Mod: PBBFAC | Performed by: NURSE PRACTITIONER

## 2022-06-21 PROCEDURE — 1160F PR REVIEW ALL MEDS BY PRESCRIBER/CLIN PHARMACIST DOCUMENTED: ICD-10-PCS | Mod: CPTII,,, | Performed by: NURSE PRACTITIONER

## 2022-06-21 RX ORDER — SODIUM CHLORIDE 0.9 % (FLUSH) 0.9 %
10 SYRINGE (ML) INJECTION
Status: SHIPPED | OUTPATIENT
Start: 2022-06-21

## 2022-06-21 RX ORDER — LIDOCAINE HYDROCHLORIDE 10 MG/ML
1 INJECTION, SOLUTION EPIDURAL; INFILTRATION; INTRACAUDAL; PERINEURAL ONCE
Status: CANCELLED | OUTPATIENT
Start: 2022-06-21 | End: 2022-06-21
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
## 2022-06-21 NOTE — LETTER
June 21, 2022    Herminio Graves  [address] [address]             [facility]  Otolaryngology  [address] [address]  Phone: [phone]   June 21, 2022     Patient: Herminio Graves   YOB: 1975   Date of Visit: 6/21/2022       To Whom it May Concern:    Herminio Graves was seen in my clinic on 6/21/2022 and will require surgical intervention. He may return to work 07 05 2022..    Please excuse him from any classes or work missed.    If you have any questions or concerns, please don't hesitate to call.    Sincerely,         ELVIRA Lock
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
## 2022-06-27 ENCOUNTER — ANESTHESIA (OUTPATIENT)
Dept: SURGERY | Facility: HOSPITAL | Age: 47
End: 2022-06-27
Payer: MEDICAID

## 2022-06-27 ENCOUNTER — HOSPITAL ENCOUNTER (OUTPATIENT)
Facility: HOSPITAL | Age: 47
Discharge: HOME | End: 2022-06-27
Attending: OTOLARYNGOLOGY | Admitting: OTOLARYNGOLOGY
Payer: MEDICAID

## 2022-06-27 VITALS
DIASTOLIC BLOOD PRESSURE: 102 MMHG | TEMPERATURE: 98 F | HEART RATE: 62 BPM | OXYGEN SATURATION: 96 % | SYSTOLIC BLOOD PRESSURE: 168 MMHG | RESPIRATION RATE: 19 BRPM

## 2022-06-27 DIAGNOSIS — J34.89 NASAL OBSTRUCTION: Primary | ICD-10-CM

## 2022-06-27 DIAGNOSIS — S02.2XXD CLOSED FRACTURE OF NASAL BONE WITH ROUTINE HEALING, SUBSEQUENT ENCOUNTER: ICD-10-CM

## 2022-06-27 DIAGNOSIS — S02.2XXA CLOSED FRACTURE OF NASAL BONE, INITIAL ENCOUNTER: ICD-10-CM

## 2022-06-27 LAB
CTP QC/QA: YES
SARS-COV-2 AG RESP QL IA.RAPID: NEGATIVE

## 2022-06-27 PROCEDURE — 37000008 HC ANESTHESIA 1ST 15 MINUTES: Performed by: OTOLARYNGOLOGY

## 2022-06-27 PROCEDURE — 71000015 HC POSTOP RECOV 1ST HR: Performed by: OTOLARYNGOLOGY

## 2022-06-27 PROCEDURE — 63600175 PHARM REV CODE 636 W HCPCS: Performed by: NURSE ANESTHETIST, CERTIFIED REGISTERED

## 2022-06-27 PROCEDURE — 27201423 OPTIME MED/SURG SUP & DEVICES STERILE SUPPLY: Performed by: OTOLARYNGOLOGY

## 2022-06-27 PROCEDURE — 36000705 HC OR TIME LEV I EA ADD 15 MIN: Performed by: OTOLARYNGOLOGY

## 2022-06-27 PROCEDURE — 63600175 PHARM REV CODE 636 W HCPCS: Performed by: SPECIALIST

## 2022-06-27 PROCEDURE — 00160 ANES PX NOSE&SINUS NOS: CPT | Performed by: OTOLARYNGOLOGY

## 2022-06-27 PROCEDURE — 25000003 PHARM REV CODE 250: Performed by: OTOLARYNGOLOGY

## 2022-06-27 PROCEDURE — 63600175 PHARM REV CODE 636 W HCPCS: Performed by: NURSE PRACTITIONER

## 2022-06-27 PROCEDURE — 37000009 HC ANESTHESIA EA ADD 15 MINS: Performed by: OTOLARYNGOLOGY

## 2022-06-27 PROCEDURE — 71000033 HC RECOVERY, INTIAL HOUR: Performed by: OTOLARYNGOLOGY

## 2022-06-27 PROCEDURE — 36000704 HC OR TIME LEV I 1ST 15 MIN: Performed by: OTOLARYNGOLOGY

## 2022-06-27 PROCEDURE — 25000003 PHARM REV CODE 250: Performed by: NURSE ANESTHETIST, CERTIFIED REGISTERED

## 2022-06-27 PROCEDURE — 25000003 PHARM REV CODE 250

## 2022-06-27 PROCEDURE — 71000016 HC POSTOP RECOV ADDL HR: Performed by: OTOLARYNGOLOGY

## 2022-06-27 RX ORDER — SODIUM CHLORIDE, SODIUM LACTATE, POTASSIUM CHLORIDE, CALCIUM CHLORIDE 600; 310; 30; 20 MG/100ML; MG/100ML; MG/100ML; MG/100ML
INJECTION, SOLUTION INTRAVENOUS CONTINUOUS
Status: DISCONTINUED | OUTPATIENT
Start: 2022-06-27 | End: 2022-06-27 | Stop reason: HOSPADM

## 2022-06-27 RX ORDER — CEFAZOLIN SODIUM 2 G/50ML
2 SOLUTION INTRAVENOUS
Status: COMPLETED | OUTPATIENT
Start: 2022-06-27 | End: 2022-06-27

## 2022-06-27 RX ORDER — IPRATROPIUM BROMIDE AND ALBUTEROL SULFATE 2.5; .5 MG/3ML; MG/3ML
3 SOLUTION RESPIRATORY (INHALATION) ONCE AS NEEDED
Status: DISCONTINUED | OUTPATIENT
Start: 2022-06-27 | End: 2022-06-27 | Stop reason: HOSPADM

## 2022-06-27 RX ORDER — HYDROMORPHONE HYDROCHLORIDE 1 MG/ML
0.5 INJECTION, SOLUTION INTRAMUSCULAR; INTRAVENOUS; SUBCUTANEOUS EVERY 5 MIN PRN
Status: DISCONTINUED | OUTPATIENT
Start: 2022-06-27 | End: 2022-06-27 | Stop reason: HOSPADM

## 2022-06-27 RX ORDER — HYDROMORPHONE HYDROCHLORIDE 1 MG/ML
0.2 INJECTION, SOLUTION INTRAMUSCULAR; INTRAVENOUS; SUBCUTANEOUS EVERY 5 MIN PRN
Status: DISCONTINUED | OUTPATIENT
Start: 2022-06-27 | End: 2022-06-27 | Stop reason: HOSPADM

## 2022-06-27 RX ORDER — PROPOFOL 10 MG/ML
INJECTION, EMULSION INTRAVENOUS
Status: DISCONTINUED | OUTPATIENT
Start: 2022-06-27 | End: 2022-06-27

## 2022-06-27 RX ORDER — LIDOCAINE HCL/PF 100 MG/5ML
SYRINGE (ML) INTRAVENOUS
Status: DISCONTINUED | OUTPATIENT
Start: 2022-06-27 | End: 2022-06-27

## 2022-06-27 RX ORDER — MEPERIDINE HYDROCHLORIDE 25 MG/ML
12.5 INJECTION INTRAMUSCULAR; INTRAVENOUS; SUBCUTANEOUS ONCE
Status: DISCONTINUED | OUTPATIENT
Start: 2022-06-27 | End: 2022-06-27 | Stop reason: HOSPADM

## 2022-06-27 RX ORDER — OXYMETAZOLINE HCL 0.05 %
SPRAY, NON-AEROSOL (ML) NASAL
Status: DISCONTINUED | OUTPATIENT
Start: 2022-06-27 | End: 2022-06-27 | Stop reason: HOSPADM

## 2022-06-27 RX ORDER — DEXAMETHASONE SODIUM PHOSPHATE 4 MG/ML
INJECTION, SOLUTION INTRA-ARTICULAR; INTRALESIONAL; INTRAMUSCULAR; INTRAVENOUS; SOFT TISSUE
Status: DISCONTINUED | OUTPATIENT
Start: 2022-06-27 | End: 2022-06-27

## 2022-06-27 RX ORDER — OXYCODONE AND ACETAMINOPHEN 5; 325 MG/1; MG/1
2 TABLET ORAL
Status: DISCONTINUED | OUTPATIENT
Start: 2022-06-27 | End: 2022-06-27 | Stop reason: HOSPADM

## 2022-06-27 RX ORDER — MIDAZOLAM HYDROCHLORIDE 1 MG/ML
INJECTION INTRAMUSCULAR; INTRAVENOUS
Status: DISCONTINUED
Start: 2022-06-27 | End: 2022-06-27 | Stop reason: HOSPADM

## 2022-06-27 RX ORDER — PROCHLORPERAZINE EDISYLATE 5 MG/ML
5 INJECTION INTRAMUSCULAR; INTRAVENOUS ONCE AS NEEDED
Status: DISCONTINUED | OUTPATIENT
Start: 2022-06-27 | End: 2022-06-27 | Stop reason: HOSPADM

## 2022-06-27 RX ORDER — DIPHENHYDRAMINE HYDROCHLORIDE 50 MG/ML
25 INJECTION INTRAMUSCULAR; INTRAVENOUS ONCE AS NEEDED
Status: DISCONTINUED | OUTPATIENT
Start: 2022-06-27 | End: 2022-06-27 | Stop reason: HOSPADM

## 2022-06-27 RX ORDER — ONDANSETRON 2 MG/ML
4 INJECTION INTRAMUSCULAR; INTRAVENOUS ONCE
Status: COMPLETED | OUTPATIENT
Start: 2022-06-27 | End: 2022-06-27

## 2022-06-27 RX ORDER — MIDAZOLAM HYDROCHLORIDE 2 MG/2ML
2 INJECTION, SOLUTION INTRAMUSCULAR; INTRAVENOUS ONCE
Status: COMPLETED | OUTPATIENT
Start: 2022-06-27 | End: 2022-06-27

## 2022-06-27 RX ORDER — LIDOCAINE HYDROCHLORIDE 10 MG/ML
1 INJECTION, SOLUTION EPIDURAL; INFILTRATION; INTRACAUDAL; PERINEURAL ONCE
Status: DISCONTINUED | OUTPATIENT
Start: 2022-06-27 | End: 2022-06-27 | Stop reason: HOSPADM

## 2022-06-27 RX ORDER — OXYMETAZOLINE HCL 0.05 %
SPRAY, NON-AEROSOL (ML) NASAL
Status: DISCONTINUED
Start: 2022-06-27 | End: 2022-06-27 | Stop reason: HOSPADM

## 2022-06-27 RX ORDER — LIDOCAINE HYDROCHLORIDE 10 MG/ML
1 INJECTION, SOLUTION EPIDURAL; INFILTRATION; INTRACAUDAL; PERINEURAL ONCE
Status: ACTIVE | OUTPATIENT
Start: 2022-06-27

## 2022-06-27 RX ADMIN — PROPOFOL 130 MG: 10 INJECTION, EMULSION INTRAVENOUS at 07:06

## 2022-06-27 RX ADMIN — MIDAZOLAM HYDROCHLORIDE 2 MG: 1 INJECTION, SOLUTION INTRAMUSCULAR; INTRAVENOUS at 07:06

## 2022-06-27 RX ADMIN — SODIUM CHLORIDE, POTASSIUM CHLORIDE, SODIUM LACTATE AND CALCIUM CHLORIDE: 600; 310; 30; 20 INJECTION, SOLUTION INTRAVENOUS at 07:06

## 2022-06-27 RX ADMIN — CEFAZOLIN SODIUM 2 G: 2 SOLUTION INTRAVENOUS at 07:06

## 2022-06-27 RX ADMIN — HYDROMORPHONE HYDROCHLORIDE 0.5 MG: 1 INJECTION, SOLUTION INTRAMUSCULAR; INTRAVENOUS; SUBCUTANEOUS at 07:06

## 2022-06-27 RX ADMIN — LIDOCAINE HYDROCHLORIDE 100 MG: 20 INJECTION INTRAVENOUS at 07:06

## 2022-06-27 RX ADMIN — ONDANSETRON 4 MG: 2 INJECTION INTRAMUSCULAR; INTRAVENOUS at 07:06

## 2022-06-27 RX ADMIN — DEXAMETHASONE SODIUM PHOSPHATE 8 MG: 4 INJECTION, SOLUTION INTRA-ARTICULAR; INTRALESIONAL; INTRAMUSCULAR; INTRAVENOUS; SOFT TISSUE at 07:06

## 2022-06-27 NOTE — ANESTHESIA POSTPROCEDURE EVALUATION
Anesthesia Post Evaluation    Patient: Herminio Graves    Procedure(s) Performed: Procedure(s) (LRB):  CLOSED REDUCTION, FRACTURE, NASAL BONE (Bilateral)    Final Anesthesia Type: general      Patient location during evaluation: PACU  Patient participation: Yes- Able to Participate  Level of consciousness: awake and responds to stimulation  Post-procedure vital signs: reviewed and stable  Pain management: adequate  Airway patency: patent    PONV status at discharge: No PONV  Anesthetic complications: no      Cardiovascular status: blood pressure returned to baseline  Respiratory status: unassisted  Hydration status: euvolemic  Follow-up not needed.          Vitals Value Taken Time   /117 06/27/22 0800   Temp 36.4 °C (97.5 °F) 06/27/22 0800   Pulse 62 06/27/22 0800   Resp 18 06/27/22 0800   SpO2 94 % 06/27/22 0800         Event Time   Out of Recovery 07:58:00         Pain/Hakan Score: Pain Rating Prior to Med Admin: 7 (6/27/2022  7:49 AM)  Hakan Score: 9 (6/27/2022  7:46 AM)

## 2022-06-27 NOTE — TRANSFER OF CARE
Anesthesia Transfer of Care Note    Patient: Herminio Graves    Procedure(s) Performed: Procedure(s) (LRB):  CLOSED REDUCTION, FRACTURE, NASAL BONE (Bilateral)    Patient location: PACU    Anesthesia Type: general    Transport from OR: Transported from OR on room air with adequate spontaneous ventilation    Post pain: adequate analgesia    Post assessment: no apparent anesthetic complications and tolerated procedure well    Post vital signs: stable    Level of consciousness: sedated and awake    Nausea/Vomiting: no nausea/vomiting    Complications: none    Transfer of care protocol was followed      Last vitals:   Visit Vitals  /78 (BP Location: Right arm, Patient Position: Lying)   Pulse 78   Temp 36 °C (96.8 °F) (Temporal)   Resp 20   SpO2 100%

## 2022-06-27 NOTE — ANESTHESIA PROCEDURE NOTES
Intubation    Date/Time: 6/27/2022 7:16 AM  Performed by: Dewayne Carmichael CRNA  Authorized by: Fide Tapia MD     Intubation:     Induction:  Intravenous    Intubated:  Postinduction    Mask Ventilation:  Easy mask    Attempts:  1    Attempted By:  CRNA    Laryngeal View Grade: Grade I - full view of cords      Difficult Airway Encountered?: No      Complications:  None    Airway Device:  Supraglottic airway/LMA    Airway Device Size:  4.0    Style/Cuff Inflation:  Cuffed (inflated to minimal occlusive pressure)    Secured at:  The lips    Placement Verified By:  Capnometry    Complicating Factors:  None    Findings Post-Intubation:  BS equal bilateral
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
## 2022-07-12 ENCOUNTER — OFFICE VISIT (OUTPATIENT)
Dept: OTOLARYNGOLOGY | Facility: CLINIC | Age: 47
End: 2022-07-12
Payer: MEDICAID

## 2022-07-12 VITALS
HEART RATE: 86 BPM | BODY MASS INDEX: 23.78 KG/M2 | TEMPERATURE: 98 F | WEIGHT: 156.38 LBS | SYSTOLIC BLOOD PRESSURE: 98 MMHG | DIASTOLIC BLOOD PRESSURE: 66 MMHG

## 2022-07-12 DIAGNOSIS — S02.2XXD CLOSED FRACTURE OF NASAL BONE WITH ROUTINE HEALING, SUBSEQUENT ENCOUNTER: Primary | ICD-10-CM

## 2022-07-12 PROCEDURE — 99212 OFFICE O/P EST SF 10 MIN: CPT | Mod: PBBFAC
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
## 2022-07-20 NOTE — PROGRESS NOTES
I have reviewed the notes, assessments, and/or procedures performed this visit, and I concur with the documentation.    Donn Schwartz M.D.

## 2023-02-21 ENCOUNTER — HOSPITAL ENCOUNTER (EMERGENCY)
Facility: HOSPITAL | Age: 48
Discharge: HOME | End: 2023-02-21
Attending: FAMILY MEDICINE
Payer: MEDICAID

## 2023-02-21 VITALS
DIASTOLIC BLOOD PRESSURE: 87 MMHG | OXYGEN SATURATION: 99 % | SYSTOLIC BLOOD PRESSURE: 144 MMHG | WEIGHT: 170.63 LBS | BODY MASS INDEX: 25.86 KG/M2 | HEART RATE: 83 BPM | RESPIRATION RATE: 16 BRPM | TEMPERATURE: 99 F | HEIGHT: 68 IN

## 2023-02-21 DIAGNOSIS — S80.01XA CONTUSION OF RIGHT KNEE, INITIAL ENCOUNTER: ICD-10-CM
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
## 2023-02-21 DIAGNOSIS — S02.2XXA CLOSED DISPLACED FRACTURE OF NASAL BONE, INITIAL ENCOUNTER: Primary | ICD-10-CM

## 2023-02-21 PROCEDURE — 99284 EMERGENCY DEPT VISIT MOD MDM: CPT | Mod: 25

## 2023-02-21 RX ORDER — NAPROXEN 500 MG/1
500 TABLET ORAL 2 TIMES DAILY PRN
Qty: 20 TABLET | Refills: 0 | Status: SHIPPED | OUTPATIENT
Start: 2023-02-21 | End: 2023-03-03

## 2023-02-21 NOTE — Clinical Note
"Herminio Abrahamiban Graves was seen and treated in our emergency department on 2/21/2023.  He may return to work on 02/27/2023.       If you have any questions or concerns, please don't hesitate to call.      Manny Macias MD"
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
## 2023-02-23 ENCOUNTER — ANESTHESIA EVENT (OUTPATIENT)
Dept: SURGERY | Facility: HOSPITAL | Age: 48
End: 2023-02-23
Payer: MEDICAID

## 2023-02-23 ENCOUNTER — OFFICE VISIT (OUTPATIENT)
Dept: OTOLARYNGOLOGY | Facility: CLINIC | Age: 48
End: 2023-02-23
Payer: MEDICAID

## 2023-02-23 VITALS
TEMPERATURE: 98 F | HEART RATE: 79 BPM | SYSTOLIC BLOOD PRESSURE: 139 MMHG | BODY MASS INDEX: 25.67 KG/M2 | DIASTOLIC BLOOD PRESSURE: 85 MMHG | WEIGHT: 168.81 LBS

## 2023-02-23 DIAGNOSIS — S02.2XXA NASAL BONE FRACTURE: ICD-10-CM

## 2023-02-23 DIAGNOSIS — S02.2XXD CLOSED FRACTURE OF NASAL BONE WITH ROUTINE HEALING, SUBSEQUENT ENCOUNTER: Primary | ICD-10-CM

## 2023-02-23 DIAGNOSIS — S02.2XXA CLOSED FRACTURE OF NASAL BONE, INITIAL ENCOUNTER: ICD-10-CM

## 2023-02-23 PROCEDURE — 99213 OFFICE O/P EST LOW 20 MIN: CPT | Mod: PBBFAC | Performed by: STUDENT IN AN ORGANIZED HEALTH CARE EDUCATION/TRAINING PROGRAM

## 2023-02-23 RX ORDER — LIDOCAINE HYDROCHLORIDE 10 MG/ML
1 INJECTION, SOLUTION EPIDURAL; INFILTRATION; INTRACAUDAL; PERINEURAL ONCE
Status: CANCELLED | OUTPATIENT
Start: 2023-02-23 | End: 2023-02-23

## 2023-02-23 RX ORDER — SODIUM CHLORIDE 0.9 % (FLUSH) 0.9 %
10 SYRINGE (ML) INJECTION
Status: SHIPPED | OUTPATIENT
Start: 2023-02-23

## 2023-02-23 NOTE — PROGRESS NOTES
PAT visit completed with patient .    Smoke   0.50 ppd x 2 years  1 ppy  ETOH   past abuse currenly drinks 2-3 beers on weekends  Drugs   Marijuana 2-3 times a week  Meds none AM DOS.
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
## 2023-02-24 ENCOUNTER — ANESTHESIA (OUTPATIENT)
Dept: SURGERY | Facility: HOSPITAL | Age: 48
End: 2023-02-24
Payer: MEDICAID

## 2023-02-24 ENCOUNTER — HOSPITAL ENCOUNTER (OUTPATIENT)
Facility: HOSPITAL | Age: 48
Discharge: HOME | End: 2023-02-24
Attending: OTOLARYNGOLOGY | Admitting: OTOLARYNGOLOGY
Payer: MEDICAID

## 2023-02-24 DIAGNOSIS — J34.89 NASAL OBSTRUCTION: Primary | ICD-10-CM

## 2023-02-24 DIAGNOSIS — S02.2XXD CLOSED FRACTURE OF NASAL BONE WITH ROUTINE HEALING, SUBSEQUENT ENCOUNTER: ICD-10-CM

## 2023-02-24 DIAGNOSIS — S02.2XXA NASAL BONE FRACTURE: ICD-10-CM

## 2023-02-24 LAB
BASOPHILS # BLD AUTO: 0.02 X10(3)/MCL (ref 0–0.2)
BASOPHILS NFR BLD AUTO: 0.3 %
EOSINOPHIL # BLD AUTO: 0.2 X10(3)/MCL (ref 0–0.9)
EOSINOPHIL NFR BLD AUTO: 3 %
ERYTHROCYTE [DISTWIDTH] IN BLOOD BY AUTOMATED COUNT: 12.7 % (ref 11.5–17)
HCT VFR BLD AUTO: 39.7 % (ref 42–52)
HGB BLD-MCNC: 13.5 G/DL (ref 14–18)
IMM GRANULOCYTES # BLD AUTO: 0.02 X10(3)/MCL (ref 0–0.04)
IMM GRANULOCYTES NFR BLD AUTO: 0.3 %
LYMPHOCYTES # BLD AUTO: 2.29 X10(3)/MCL (ref 0.6–4.6)
LYMPHOCYTES NFR BLD AUTO: 34.3 %
MCH RBC QN AUTO: 32.3 PG
MCHC RBC AUTO-ENTMCNC: 34 G/DL (ref 33–36)
MCV RBC AUTO: 95 FL (ref 80–94)
MONOCYTES # BLD AUTO: 0.75 X10(3)/MCL (ref 0.1–1.3)
MONOCYTES NFR BLD AUTO: 11.2 %
NEUTROPHILS # BLD AUTO: 3.39 X10(3)/MCL (ref 2.1–9.2)
NEUTROPHILS NFR BLD AUTO: 50.9 %
NRBC BLD AUTO-RTO: 0 %
PLATELET # BLD AUTO: 146 X10(3)/MCL (ref 130–400)
PLATELETS.RETICULATED NFR BLD AUTO: 7.1 % (ref 0.9–11.2)
PMV BLD AUTO: 11 FL (ref 7.4–10.4)
RBC # BLD AUTO: 4.18 X10(6)/MCL (ref 4.7–6.1)
WBC # SPEC AUTO: 6.7 X10(3)/MCL (ref 4.5–11.5)

## 2023-02-24 PROCEDURE — 25000003 PHARM REV CODE 250: Performed by: OTOLARYNGOLOGY

## 2023-02-24 PROCEDURE — 37000009 HC ANESTHESIA EA ADD 15 MINS: Performed by: OTOLARYNGOLOGY

## 2023-02-24 PROCEDURE — 94640 AIRWAY INHALATION TREATMENT: CPT

## 2023-02-24 PROCEDURE — 27201423 OPTIME MED/SURG SUP & DEVICES STERILE SUPPLY: Performed by: OTOLARYNGOLOGY

## 2023-02-24 PROCEDURE — 25000242 PHARM REV CODE 250 ALT 637 W/ HCPCS: Performed by: ANESTHESIOLOGY

## 2023-02-24 PROCEDURE — 63600175 PHARM REV CODE 636 W HCPCS: Performed by: NURSE ANESTHETIST, CERTIFIED REGISTERED

## 2023-02-24 PROCEDURE — 71000016 HC POSTOP RECOV ADDL HR: Performed by: OTOLARYNGOLOGY

## 2023-02-24 PROCEDURE — 37000008 HC ANESTHESIA 1ST 15 MINUTES: Performed by: OTOLARYNGOLOGY

## 2023-02-24 PROCEDURE — 36000705 HC OR TIME LEV I EA ADD 15 MIN: Performed by: OTOLARYNGOLOGY

## 2023-02-24 PROCEDURE — 63600175 PHARM REV CODE 636 W HCPCS: Performed by: ANESTHESIOLOGY

## 2023-02-24 PROCEDURE — 63600175 PHARM REV CODE 636 W HCPCS

## 2023-02-24 PROCEDURE — 85025 COMPLETE CBC W/AUTO DIFF WBC: CPT | Performed by: STUDENT IN AN ORGANIZED HEALTH CARE EDUCATION/TRAINING PROGRAM

## 2023-02-24 PROCEDURE — 71000015 HC POSTOP RECOV 1ST HR: Performed by: OTOLARYNGOLOGY

## 2023-02-24 PROCEDURE — 01210 ANES OPEN PX HIP JOINT NOS: CPT | Performed by: OTOLARYNGOLOGY

## 2023-02-24 PROCEDURE — 71000033 HC RECOVERY, INTIAL HOUR: Performed by: OTOLARYNGOLOGY

## 2023-02-24 PROCEDURE — 94761 N-INVAS EAR/PLS OXIMETRY MLT: CPT

## 2023-02-24 PROCEDURE — 25000003 PHARM REV CODE 250: Performed by: NURSE ANESTHETIST, CERTIFIED REGISTERED

## 2023-02-24 PROCEDURE — 36000704 HC OR TIME LEV I 1ST 15 MIN: Performed by: OTOLARYNGOLOGY

## 2023-02-24 PROCEDURE — 25000003 PHARM REV CODE 250: Performed by: ANESTHESIOLOGY

## 2023-02-24 RX ORDER — PROPOFOL 10 MG/ML
VIAL (ML) INTRAVENOUS
Status: DISCONTINUED | OUTPATIENT
Start: 2023-02-24 | End: 2023-02-24

## 2023-02-24 RX ORDER — MEPERIDINE HYDROCHLORIDE 50 MG/ML
12.5 INJECTION INTRAMUSCULAR; INTRAVENOUS; SUBCUTANEOUS ONCE
Status: DISCONTINUED | OUTPATIENT
Start: 2023-02-24 | End: 2023-02-24 | Stop reason: HOSPADM

## 2023-02-24 RX ORDER — HYDROMORPHONE HYDROCHLORIDE 1 MG/ML
0.2 INJECTION, SOLUTION INTRAMUSCULAR; INTRAVENOUS; SUBCUTANEOUS EVERY 5 MIN PRN
Status: DISCONTINUED | OUTPATIENT
Start: 2023-02-24 | End: 2023-02-24 | Stop reason: HOSPADM

## 2023-02-24 RX ORDER — GLYCOPYRROLATE 0.2 MG/ML
INJECTION INTRAMUSCULAR; INTRAVENOUS
Status: DISCONTINUED | OUTPATIENT
Start: 2023-02-24 | End: 2023-02-24

## 2023-02-24 RX ORDER — DEXAMETHASONE SODIUM PHOSPHATE 4 MG/ML
INJECTION, SOLUTION INTRA-ARTICULAR; INTRALESIONAL; INTRAMUSCULAR; INTRAVENOUS; SOFT TISSUE
Status: DISCONTINUED | OUTPATIENT
Start: 2023-02-24 | End: 2023-02-24

## 2023-02-24 RX ORDER — OXYMETAZOLINE HCL 0.05 %
SPRAY, NON-AEROSOL (ML) NASAL
Status: DISCONTINUED | OUTPATIENT
Start: 2023-02-24 | End: 2023-02-24 | Stop reason: HOSPADM

## 2023-02-24 RX ORDER — FLUTICASONE PROPIONATE 50 MCG
1 SPRAY, SUSPENSION (ML) NASAL DAILY
Qty: 15.8 ML | Refills: 2 | Status: SHIPPED | OUTPATIENT
Start: 2023-02-24 | End: 2023-11-29 | Stop reason: ALTCHOICE

## 2023-02-24 RX ORDER — MIDAZOLAM HYDROCHLORIDE 1 MG/ML
INJECTION INTRAMUSCULAR; INTRAVENOUS
Status: COMPLETED
Start: 2023-02-24 | End: 2023-02-24

## 2023-02-24 RX ORDER — SODIUM CHLORIDE, SODIUM LACTATE, POTASSIUM CHLORIDE, CALCIUM CHLORIDE 600; 310; 30; 20 MG/100ML; MG/100ML; MG/100ML; MG/100ML
INJECTION, SOLUTION INTRAVENOUS CONTINUOUS
Status: DISCONTINUED | OUTPATIENT
Start: 2023-02-24 | End: 2023-02-24 | Stop reason: HOSPADM

## 2023-02-24 RX ORDER — DIPHENHYDRAMINE HYDROCHLORIDE 50 MG/ML
6.25 INJECTION INTRAMUSCULAR; INTRAVENOUS ONCE AS NEEDED
Status: DISCONTINUED | OUTPATIENT
Start: 2023-02-24 | End: 2023-02-24 | Stop reason: HOSPADM

## 2023-02-24 RX ORDER — LIDOCAINE HYDROCHLORIDE 10 MG/ML
1 INJECTION, SOLUTION EPIDURAL; INFILTRATION; INTRACAUDAL; PERINEURAL ONCE
Status: DISCONTINUED | OUTPATIENT
Start: 2023-02-24 | End: 2023-02-24 | Stop reason: HOSPADM

## 2023-02-24 RX ORDER — MEPERIDINE HYDROCHLORIDE 25 MG/ML
INJECTION INTRAMUSCULAR; INTRAVENOUS; SUBCUTANEOUS
Status: COMPLETED
Start: 2023-02-24 | End: 2023-02-24

## 2023-02-24 RX ORDER — FENTANYL CITRATE 50 UG/ML
INJECTION, SOLUTION INTRAMUSCULAR; INTRAVENOUS
Status: DISCONTINUED | OUTPATIENT
Start: 2023-02-24 | End: 2023-02-24

## 2023-02-24 RX ORDER — ONDANSETRON 2 MG/ML
INJECTION INTRAMUSCULAR; INTRAVENOUS
Status: DISCONTINUED | OUTPATIENT
Start: 2023-02-24 | End: 2023-02-24

## 2023-02-24 RX ORDER — DEXTROMETHORPHAN HYDROBROMIDE, GUAIFENESIN 5; 100 MG/5ML; MG/5ML
650 LIQUID ORAL EVERY 8 HOURS
Qty: 30 TABLET | Refills: 0 | Status: SHIPPED | OUTPATIENT
Start: 2023-02-24

## 2023-02-24 RX ORDER — SUCCINYLCHOLINE CHLORIDE 20 MG/ML
INJECTION INTRAMUSCULAR; INTRAVENOUS
Status: DISCONTINUED | OUTPATIENT
Start: 2023-02-24 | End: 2023-02-24

## 2023-02-24 RX ORDER — MIDAZOLAM HYDROCHLORIDE 1 MG/ML
1 INJECTION INTRAMUSCULAR; INTRAVENOUS ONCE AS NEEDED
Status: COMPLETED | OUTPATIENT
Start: 2023-02-24 | End: 2023-02-24

## 2023-02-24 RX ORDER — LABETALOL HYDROCHLORIDE 5 MG/ML
INJECTION, SOLUTION INTRAVENOUS
Status: DISCONTINUED | OUTPATIENT
Start: 2023-02-24 | End: 2023-02-24

## 2023-02-24 RX ORDER — KETOROLAC TROMETHAMINE 30 MG/ML
INJECTION, SOLUTION INTRAMUSCULAR; INTRAVENOUS
Status: DISCONTINUED | OUTPATIENT
Start: 2023-02-24 | End: 2023-02-24

## 2023-02-24 RX ORDER — LIDOCAINE HYDROCHLORIDE 20 MG/ML
INJECTION INTRAVENOUS
Status: DISCONTINUED | OUTPATIENT
Start: 2023-02-24 | End: 2023-02-24

## 2023-02-24 RX ORDER — DROPERIDOL 2.5 MG/ML
0.25 INJECTION, SOLUTION INTRAMUSCULAR; INTRAVENOUS ONCE AS NEEDED
Status: DISCONTINUED | OUTPATIENT
Start: 2023-02-24 | End: 2023-02-24 | Stop reason: HOSPADM

## 2023-02-24 RX ORDER — OXYCODONE HYDROCHLORIDE 5 MG/1
5 TABLET ORAL
Status: DISCONTINUED | OUTPATIENT
Start: 2023-02-24 | End: 2023-02-24 | Stop reason: HOSPADM

## 2023-02-24 RX ORDER — METOCLOPRAMIDE HYDROCHLORIDE 5 MG/ML
10 INJECTION INTRAMUSCULAR; INTRAVENOUS ONCE AS NEEDED
Status: DISCONTINUED | OUTPATIENT
Start: 2023-02-24 | End: 2023-02-24 | Stop reason: HOSPADM

## 2023-02-24 RX ORDER — ROCURONIUM BROMIDE 10 MG/ML
INJECTION, SOLUTION INTRAVENOUS
Status: DISCONTINUED | OUTPATIENT
Start: 2023-02-24 | End: 2023-02-24

## 2023-02-24 RX ORDER — ALBUTEROL SULFATE 0.83 MG/ML
2.5 SOLUTION RESPIRATORY (INHALATION) EVERY 4 HOURS PRN
Status: DISCONTINUED | OUTPATIENT
Start: 2023-02-24 | End: 2023-02-24 | Stop reason: HOSPADM

## 2023-02-24 RX ADMIN — LIDOCAINE HYDROCHLORIDE 50 MG: 20 INJECTION, SOLUTION INTRAVENOUS at 07:02

## 2023-02-24 RX ADMIN — OXYCODONE HYDROCHLORIDE 5 MG: 5 TABLET ORAL at 08:02

## 2023-02-24 RX ADMIN — MIDAZOLAM HYDROCHLORIDE 1 MG: 1 INJECTION, SOLUTION INTRAMUSCULAR; INTRAVENOUS at 06:02

## 2023-02-24 RX ADMIN — ALBUTEROL SULFATE 2.5 MG: 2.5 SOLUTION RESPIRATORY (INHALATION) at 05:02

## 2023-02-24 RX ADMIN — MEPERIDINE HYDROCHLORIDE 25 MG: 25 INJECTION INTRAMUSCULAR; INTRAVENOUS; SUBCUTANEOUS at 07:02

## 2023-02-24 RX ADMIN — SUCCINYLCHOLINE CHLORIDE 120 MG: 20 INJECTION, SOLUTION INTRAMUSCULAR; INTRAVENOUS at 07:02

## 2023-02-24 RX ADMIN — PROPOFOL 175 MG: 10 INJECTION, EMULSION INTRAVENOUS at 07:02

## 2023-02-24 RX ADMIN — MIDAZOLAM HYDROCHLORIDE 2 MG: 1 INJECTION, SOLUTION INTRAMUSCULAR; INTRAVENOUS at 06:02

## 2023-02-24 RX ADMIN — SODIUM CHLORIDE, POTASSIUM CHLORIDE, SODIUM LACTATE AND CALCIUM CHLORIDE: 600; 310; 30; 20 INJECTION, SOLUTION INTRAVENOUS at 06:02

## 2023-02-24 RX ADMIN — DEXAMETHASONE SODIUM PHOSPHATE 8 MG: 4 INJECTION, SOLUTION INTRA-ARTICULAR; INTRALESIONAL; INTRAMUSCULAR; INTRAVENOUS; SOFT TISSUE at 07:02

## 2023-02-24 RX ADMIN — FENTANYL CITRATE 50 MCG: 50 INJECTION, SOLUTION INTRAMUSCULAR; INTRAVENOUS at 07:02

## 2023-02-24 RX ADMIN — LABETALOL HYDROCHLORIDE 5 MG: 5 INJECTION INTRAVENOUS at 07:02

## 2023-02-24 RX ADMIN — ONDANSETRON 4 MG: 2 INJECTION INTRAMUSCULAR; INTRAVENOUS at 07:02

## 2023-02-24 RX ADMIN — ROCURONIUM BROMIDE 5 MG: 10 INJECTION, SOLUTION INTRAVENOUS at 07:02

## 2023-02-24 RX ADMIN — KETOROLAC TROMETHAMINE 30 MG: 30 INJECTION, SOLUTION INTRAMUSCULAR; INTRAVENOUS at 07:02

## 2023-02-24 NOTE — TRANSFER OF CARE
Anesthesia Transfer of Care Note    Patient: Herminio Graves    Procedure(s) Performed: Procedure(s) (LRB):  CLOSED REDUCTION, FRACTURE, NASAL BONE (Bilateral)    Patient location: PACU    Anesthesia Type: general    Transport from OR: Transported from OR on room air with adequate spontaneous ventilation    Post pain: adequate analgesia    Post assessment: no apparent anesthetic complications and tolerated procedure well    Post vital signs: stable    Level of consciousness: sedated    Nausea/Vomiting: no nausea/vomiting    Complications: none    Transfer of care protocol was followedComments: Report to Yusuf LYLES      Last vitals:   Visit Vitals  /72   Pulse 77   Temp 36.3 °C (97.3 °F) (Temporal)   Resp 20   SpO2 100%

## 2023-02-24 NOTE — BRIEF OP NOTE
[facility]  Brief Operative Note    Surgery Date: 2/24/2023     Surgeon(s) and Role:     * Donn Schwartz MD - Primary     * Dev Kat MD - Resident - Assisting        Pre-op Diagnosis:  Closed fracture of nasal bone    Post-op Diagnosis:  Post-Op Diagnosis Codes:     * Closed fracture of nasal bone with routine healing, subsequent encounter [S02.2XXD]    Procedure(s) (LRB):  CLOSED REDUCTION, FRACTURE, NASAL BONE (Bilateral)    Anesthesia: General    Operative Findings: left depressed nasal bone fracture with narrowing of the internal nasal valve    Estimated Blood Loss: 1 mL         Specimens:   Specimen (24h ago, onward)      None              Discharge Note    OUTCOME: Patient tolerated treatment/procedure well without complication and is now ready for discharge.    DISPOSITION: Home or Self Care    FINAL DIAGNOSIS:  <principal problem not specified>    FOLLOWUP: In clinic in 1 week    DISCHARGE INSTRUCTIONS:    Discharge Procedure Orders   Diet Adult Regular     Leave dressing on - Keep it clean, dry, and intact until clinic visit
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
## 2023-02-24 NOTE — LETTER
February 24, 2023         [address] [facility] [address]  Phone: [phone]  Fax: [phone]       Patient: Herminio Graves   YOB: 1975  Date of Visit: 02/24/2023    To Whom It May Concern:    Mitzi Graves  was at [facility] on 02/24/2023. The patient may return to work on March 3, 2023. If you have any questions or concerns, or if I can be of further assistance, please do not hesitate to contact me.    Sincerely,    Chloe Dubois RN

## 2023-02-24 NOTE — DISCHARGE INSTRUCTIONS
· Keep follow up appointment at the [facility]ENT[facility] March 2, 2023.  Resume home medications unless otherwise instructed by your doctor.    · You may take a shower tomorrow.     · You have dissolvable packing in your nose.  Use saline nasal spray every 2 hours while awake and as needed to keep moist. You will likely have some bleeding from your nose. If small amount of blood is oozing from your nose, spray over the counter Afrin in each nostril.  Call clinic immediately if bleeding does not stop or if it is a large amount running down throat or out nose AND go to ER.    ` You have a splint across the bridge of your nose.  Keep this in place, clean and dry until clinic follow up visit.    · Do NOT blow your nose, sneeze through mouth only. Call clinic immediately with any nausea/vomiting or go to ER.    · You may alternate Ibuprofen and Tylenol every 4-6 hours as needed for pain or discomfort.  If you are experiencing severe pain even with your pain medications, please call the ENT clinic at [phone] to notify your doctor.    · You may use an ice pack as needed for 20 minutes at a time over your cheeks/forehead to minimize swelling and help relieve pain.      `  Sleep with head of bed elevated (using pillows if necessary) to help with pain and minimize swelling.    · Do not drink alcohol or drive today, or as long as you are on pain medication.    · Notify MD of any moderate to severe pain unrelieved by pain medicine, if you have continuous or severe bleeding, or for any signs of infection including fever above 100.4, excessive redness or swelling, yellow/green foul- smelling drainage, nausea or vomiting. Clinics number is [phone]. If it is after business hours or emergency call [phone] and state Im having post op complications and need to speak to the ENT surgeon on call.    · Thanks for choosing [facility]! Have a smooth recovery!

## 2023-02-24 NOTE — ANESTHESIA POSTPROCEDURE EVALUATION
Anesthesia Post Evaluation    Patient: Herminio Graves    Procedure(s) Performed: Procedure(s) (LRB):  CLOSED REDUCTION, FRACTURE, NASAL BONE (Bilateral)    Final Anesthesia Type: general      Patient location during evaluation: PACU  Patient participation: Yes- Able to Participate  Level of consciousness: awake and alert  Post-procedure vital signs: reviewed and stable  Pain management: adequate  Airway patency: patent    PONV status at discharge: No PONV  Anesthetic complications: no      Cardiovascular status: hemodynamically stable  Respiratory status: unassisted  Hydration status: euvolemic  Follow-up not needed.          Vitals Value Taken Time   /88 02/24/23 0757   Temp 36.8 °C (98.2 °F) 02/24/23 0749   Pulse 82 02/24/23 0757   Resp 16 02/24/23 0757   SpO2 98 % 02/24/23 0757         Event Time   Out of Recovery 07:58:00         Pain/Hakan Score: Pain Rating Prior to Med Admin: 6 (2/24/2023  7:49 AM)

## 2023-02-24 NOTE — OP NOTE
Operative Note    Patient: Herminio Graves  MRN: [id number]    Date: 02/24/2023    Pre-op Diagnosis:  Closed fracture of nasal bone     Post-op Diagnosis:  Post-Op Diagnosis Codes:     * Closed fracture of nasal bone with routine healing, subsequent encounter [S02.2XXD]      Procedure: Closed nasal bone reduction    Staff: Dr. Schwartz    Surgeon: Biju Wright MD    Anesthesia: General    Implants: none    Drains: none    Specimens: none    Complications: none    Dispo: Patient transferred to PACU in stable condition.  He will be discharged home today after observation and meets clinical criteria.  He will follow up in clinic in 1 week for nasal cast removal.    Findings:  left depressed nasal bone fracture with narrowing of the internal nasal valve.  Nasal septum midline with no significant deviation.     Indication for Procedure: 48 yo male with left nasal bone fracture with narrowing of internal nasal valve and nasal obstruction.    Procedure in Detail:  Risks, benefits and alternatives of the procedure were explained in detail and informed consent was obtained. Patient was taken to the OR and placed on operating table in the supine position. General anesthesia was induced without complication. Afrin pledgets were placed in bilateral nasal cavities for adequate kathi-operative nasal decongestion.  He draped in the standard fashion. Timeout was conducted in accordance with hospital policy.    Afrin pledgets were removed from the bilateral nasal cavities.  Anterior rhinoscopy was performed with findings above.  Boises elevator was used to reduce the depressed nasal bone fracture into proper position.  Anterior rhinoscopy performed after reduction of nasal bone and internal nasal valve was patent.  Afrin soaked pledget were inserted into the bilateral nasal cavities for hemostasis.  Pledgets were removed and nasopore packing was placed in the left nasal cavity to provide internal support to the lateral nasal wall.   External nasal splint was then placed along the nasal dorsum.  This concluded the procedure and patient was returned to the anesthesia team.  Patient was awakened and extubated without complication.  Patient tolerated the procedure well and was transferred to PACU in stable condition.    Dr. Schwartz was present and immediately available for the entirety of the procedure.    Biju Wright MD  [employer] Otolaryngology PGY-3

## 2023-02-24 NOTE — ANESTHESIA PROCEDURE NOTES
Intubation    Date/Time: 2/24/2023 7:10 AM  Performed by: Keon Membreno CRNA  Authorized by: Palmira Fernandez MD     Intubation:     Induction:  Intravenous    Intubated:  Postinduction    Mask Ventilation:  Easy with oral airway    Attempts:  1    Attempted By:  CRNA    Method of Intubation:  Direct    Blade:  Jacki 4    Laryngeal View Grade: Grade IIb - only the arytenoids and epiglottis seen      Difficult Airway Encountered?: No      Complications:  None    Airway Device:  Oral endotracheal tube    Airway Device Size:  7.5    Style/Cuff Inflation:  Cuffed (inflated to minimal occlusive pressure)    Inflation Amount (mL):  6    Tube secured:  21    Secured at:  The lips    Placement Verified By:  Capnometry    Complicating Factors:  None    Findings Post-Intubation:  BS equal bilateral and atraumatic/condition of teeth unchanged

## 2023-02-24 NOTE — LETTER
February 24, 2023         [address] [facility] [address]  Phone: [phone]  Fax: [phone]       Patient: Herminio Graves   YOB: 1975  Date of Visit: 02/24/2023    To Whom It May Concern:    Mitzi Graves  was at [facility] on 02/24/2023. The patient may return to work on March 10, 2023. If you have any questions or concerns, or if I can be of further assistance, please do not hesitate to contact me.    Sincerely,    Chloe Dubois RN

## 2023-02-27 VITALS
HEART RATE: 80 BPM | OXYGEN SATURATION: 98 % | RESPIRATION RATE: 18 BRPM | DIASTOLIC BLOOD PRESSURE: 90 MMHG | SYSTOLIC BLOOD PRESSURE: 140 MMHG | BODY MASS INDEX: 26.82 KG/M2 | WEIGHT: 176.38 LBS | TEMPERATURE: 98 F

## 2023-03-09 NOTE — PROGRESS NOTES
I reviewed the history and physical exam with the resident.   I agree with findings and plan.    Donn Schwartz M.D.

## 2023-03-22 ENCOUNTER — OFFICE VISIT (OUTPATIENT)
Dept: OTOLARYNGOLOGY | Facility: CLINIC | Age: 48
End: 2023-03-22
Payer: MEDICAID

## 2023-03-22 VITALS — HEART RATE: 81 BPM | DIASTOLIC BLOOD PRESSURE: 75 MMHG | SYSTOLIC BLOOD PRESSURE: 113 MMHG

## 2023-03-22 DIAGNOSIS — S02.2XXD CLOSED FRACTURE OF NASAL BONE WITH ROUTINE HEALING, SUBSEQUENT ENCOUNTER: Primary | ICD-10-CM

## 2023-03-22 PROCEDURE — 99213 OFFICE O/P EST LOW 20 MIN: CPT | Mod: PBBFAC | Performed by: STUDENT IN AN ORGANIZED HEALTH CARE EDUCATION/TRAINING PROGRAM
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
## 2023-09-29 ENCOUNTER — HOSPITAL ENCOUNTER (EMERGENCY)
Facility: HOSPITAL | Age: 48
Discharge: HOME | End: 2023-09-30
Attending: EMERGENCY MEDICINE

## 2023-09-29 DIAGNOSIS — K86.0 ALCOHOL-INDUCED CHRONIC PANCREATITIS: Primary | ICD-10-CM

## 2023-09-29 DIAGNOSIS — R10.9 ABDOMINAL PAIN: ICD-10-CM

## 2023-09-29 LAB
ALBUMIN SERPL-MCNC: 4 G/DL (ref 3.5–5)
ALBUMIN/GLOB SERPL: 1.1 RATIO (ref 1.1–2)
ALP SERPL-CCNC: 67 UNIT/L (ref 40–150)
ALT SERPL-CCNC: 33 UNIT/L (ref 0–55)
APPEARANCE UR: CLEAR
AST SERPL-CCNC: 41 UNIT/L (ref 5–34)
BACTERIA #/AREA URNS AUTO: ABNORMAL /HPF
BASOPHILS # BLD AUTO: 0.03 X10(3)/MCL
BASOPHILS NFR BLD AUTO: 0.5 %
BILIRUB SERPL-MCNC: 0.3 MG/DL
BILIRUB UR QL STRIP.AUTO: NEGATIVE
BUN SERPL-MCNC: 12.1 MG/DL (ref 8.9–20.6)
CALCIUM SERPL-MCNC: 9.2 MG/DL (ref 8.4–10.2)
CHLORIDE SERPL-SCNC: 106 MMOL/L (ref 98–107)
CO2 SERPL-SCNC: 22 MMOL/L (ref 22–29)
COLOR UR AUTO: YELLOW
CREAT SERPL-MCNC: 1.11 MG/DL (ref 0.73–1.18)
EOSINOPHIL # BLD AUTO: 0.13 X10(3)/MCL (ref 0–0.9)
EOSINOPHIL NFR BLD AUTO: 2.3 %
ERYTHROCYTE [DISTWIDTH] IN BLOOD BY AUTOMATED COUNT: 13.1 % (ref 11.5–17)
GFR SERPLBLD CREATININE-BSD FMLA CKD-EPI: >60 MLS/MIN/1.73/M2
GLOBULIN SER-MCNC: 3.6 GM/DL (ref 2.4–3.5)
GLUCOSE SERPL-MCNC: 87 MG/DL (ref 74–100)
GLUCOSE UR QL STRIP.AUTO: NORMAL
HCT VFR BLD AUTO: 37.4 % (ref 42–52)
HGB BLD-MCNC: 12.5 G/DL (ref 14–18)
HYALINE CASTS #/AREA URNS LPF: ABNORMAL /LPF
IMM GRANULOCYTES # BLD AUTO: 0.01 X10(3)/MCL (ref 0–0.04)
IMM GRANULOCYTES NFR BLD AUTO: 0.2 %
KETONES UR QL STRIP.AUTO: ABNORMAL
LEUKOCYTE ESTERASE UR QL STRIP.AUTO: NEGATIVE
LIPASE SERPL-CCNC: 56 U/L
LYMPHOCYTES # BLD AUTO: 2.56 X10(3)/MCL (ref 0.6–4.6)
LYMPHOCYTES NFR BLD AUTO: 45.1 %
MCH RBC QN AUTO: 32.2 PG (ref 27–31)
MCHC RBC AUTO-ENTMCNC: 33.4 G/DL (ref 33–36)
MCV RBC AUTO: 96.4 FL (ref 80–94)
MONOCYTES # BLD AUTO: 0.46 X10(3)/MCL (ref 0.1–1.3)
MONOCYTES NFR BLD AUTO: 8.1 %
MUCOUS THREADS URNS QL MICRO: ABNORMAL /LPF
NEUTROPHILS # BLD AUTO: 2.49 X10(3)/MCL (ref 2.1–9.2)
NEUTROPHILS NFR BLD AUTO: 43.8 %
NITRITE UR QL STRIP.AUTO: NEGATIVE
NRBC BLD AUTO-RTO: 0 %
PH UR STRIP.AUTO: 5 [PH]
PLATELET # BLD AUTO: 186 X10(3)/MCL (ref 130–400)
PMV BLD AUTO: 10.7 FL (ref 7.4–10.4)
POTASSIUM SERPL-SCNC: 4.2 MMOL/L (ref 3.5–5.1)
PROT SERPL-MCNC: 7.6 GM/DL (ref 6.4–8.3)
PROT UR QL STRIP.AUTO: ABNORMAL
RBC # BLD AUTO: 3.88 X10(6)/MCL (ref 4.7–6.1)
RBC #/AREA URNS AUTO: ABNORMAL /HPF
RBC UR QL AUTO: NEGATIVE
SODIUM SERPL-SCNC: 142 MMOL/L (ref 136–145)
SP GR UR STRIP.AUTO: 1.03 (ref 1–1.03)
SQUAMOUS #/AREA URNS LPF: ABNORMAL /HPF
UROBILINOGEN UR STRIP-ACNC: NORMAL
WBC # SPEC AUTO: 5.68 X10(3)/MCL (ref 4.5–11.5)
WBC #/AREA URNS AUTO: ABNORMAL /HPF

## 2023-09-29 PROCEDURE — 81001 URINALYSIS AUTO W/SCOPE: CPT | Performed by: PHYSICIAN ASSISTANT

## 2023-09-29 PROCEDURE — 25000003 PHARM REV CODE 250: Performed by: PHYSICIAN ASSISTANT

## 2023-09-29 PROCEDURE — 80053 COMPREHEN METABOLIC PANEL: CPT | Performed by: PHYSICIAN ASSISTANT

## 2023-09-29 PROCEDURE — 99285 EMERGENCY DEPT VISIT HI MDM: CPT | Mod: 25

## 2023-09-29 PROCEDURE — 83690 ASSAY OF LIPASE: CPT | Performed by: PHYSICIAN ASSISTANT

## 2023-09-29 PROCEDURE — 63600175 PHARM REV CODE 636 W HCPCS: Performed by: PHYSICIAN ASSISTANT

## 2023-09-29 PROCEDURE — 85025 COMPLETE CBC W/AUTO DIFF WBC: CPT | Performed by: PHYSICIAN ASSISTANT

## 2023-09-29 PROCEDURE — 96372 THER/PROPH/DIAG INJ SC/IM: CPT | Performed by: PHYSICIAN ASSISTANT

## 2023-09-29 RX ORDER — ONDANSETRON 4 MG/1
4 TABLET, ORALLY DISINTEGRATING ORAL
Status: COMPLETED | OUTPATIENT
Start: 2023-09-29 | End: 2023-09-29

## 2023-09-29 RX ORDER — KETOROLAC TROMETHAMINE 30 MG/ML
15 INJECTION, SOLUTION INTRAMUSCULAR; INTRAVENOUS
Status: COMPLETED | OUTPATIENT
Start: 2023-09-29 | End: 2023-09-29

## 2023-09-29 RX ADMIN — ONDANSETRON 4 MG: 4 TABLET, ORALLY DISINTEGRATING ORAL at 09:09

## 2023-09-29 RX ADMIN — KETOROLAC TROMETHAMINE 15 MG: 30 INJECTION, SOLUTION INTRAMUSCULAR; INTRAVENOUS at 09:09

## 2023-09-29 NOTE — Clinical Note
"Herminio Abrahamy" Ely was seen and treated in our emergency department on 9/29/2023.  He may return to work on 10/02/2023.       If you have any questions or concerns, please don't hesitate to call.      Jeff Tarango PA"

## 2023-09-30 VITALS
WEIGHT: 159.5 LBS | BODY MASS INDEX: 24.17 KG/M2 | OXYGEN SATURATION: 98 % | HEART RATE: 77 BPM | TEMPERATURE: 98 F | RESPIRATION RATE: 16 BRPM | DIASTOLIC BLOOD PRESSURE: 74 MMHG | HEIGHT: 68 IN | SYSTOLIC BLOOD PRESSURE: 120 MMHG

## 2023-09-30 RX ORDER — INDOMETHACIN 25 MG/1
25 CAPSULE ORAL 3 TIMES DAILY PRN
Qty: 15 CAPSULE | Refills: 0 | Status: SHIPPED | OUTPATIENT
Start: 2023-09-30 | End: 2023-11-29 | Stop reason: ALTCHOICE

## 2023-09-30 RX ORDER — ONDANSETRON 4 MG/1
4 TABLET, FILM COATED ORAL EVERY 6 HOURS
Qty: 12 TABLET | Refills: 0 | Status: SHIPPED | OUTPATIENT
Start: 2023-09-30 | End: 2023-11-29 | Stop reason: ALTCHOICE

## 2023-09-30 NOTE — DISCHARGE INSTRUCTIONS
Report to Emergency Department if symptoms return or worsen; [facility] Medicine Clinic Within 1 to 2 days, It is important that you follow up with your primary care provider or specialist if indicated for further evaluation, workup, and treatment as necessary. The exam and treatment you received in Emergency Department was for an urgent problem and NOT INTENDED AS COMPLETE CARE. It is important that you FOLLOW UP with a doctor for ongoing care. If your symptoms become WORSE or you DO NOT IMPROVE and you are unable to reach your health care provider, you should RETURN to the Emergency Department. The Emergency Department provider has provided a PRELIMINARY INTERPRETATION of all your studies. A final interpretation may be done after you are discharged. If a change in your diagnosis or treatment is needed WE WILL CONTACT YOU. It is critical that we have a CURRENT PHONE NUMBER FOR YOU.

## 2023-09-30 NOTE — ED PROVIDER NOTES
Encounter Date: 2023       History     Chief Complaint   Patient presents with    Abdominal Pain     Pt arrives with c/o right side lower abd pain radiating to his back x1 week; pt denies any bowel or urinary dysfunction     Patient with no pmhx presents today c/o intermittent RLQ abdominal pain that started 1 week ago. Pain radiates into lower and midback. No exacerbating or relieving factors. He also endorses some nausea. Last bowel movement was this morning.     The history is provided by the patient. No  was used.     Review of patient's allergies indicates:  No Known Allergies  Past Medical History:   Diagnosis Date    Alcoholic pancreatitis     Alcoholism     Disease of pancreas, unspecified     Marijuana abuse      Past Surgical History:   Procedure Laterality Date    CLOSED REDUCTION OF FRACTURE OF NASAL BONE Bilateral 2022    Procedure: CLOSED REDUCTION, FRACTURE, NASAL BONE;  Surgeon: Donn Schwartz MD;  Location: [facility] OR;  Service: ENT;  Laterality: Bilateral;    CLOSED REDUCTION OF FRACTURE OF NASAL BONE Bilateral 2023    Procedure: CLOSED REDUCTION, FRACTURE, NASAL BONE;  Surgeon: Donn Schwartz MD;  Location: [facility];  Service: ENT;  Laterality: Bilateral;    HERNIA REPAIR       Family History   Problem Relation Age of Onset    Hypertension Mother     Pancreatic cancer Father     Alcohol abuse Father      Social History     Tobacco Use    Smoking status: Every Day     Current packs/day: 0.00     Average packs/day: 0.5 packs/day for 2.0 years (1.0 ttl pk-yrs)     Types: Cigarettes     Start date: 2020     Last attempt to quit: 2022     Years since quittin.3    Smokeless tobacco: Never   Substance Use Topics    Alcohol use: Yes     Alcohol/week: 2.0 standard drinks of alcohol     Types: 2 Cans of beer per week     Comment: on weekends    Drug use: Yes     Frequency: 3.0 times per week     Types: Marijuana     Comment: monthly     Review of Systems    Constitutional:  Negative for chills and fever.   Respiratory:  Negative for cough, chest tightness and shortness of breath.    Cardiovascular:  Negative for chest pain, palpitations and leg swelling.   Gastrointestinal:  Positive for abdominal pain and nausea. Negative for constipation, diarrhea and vomiting.   Genitourinary:  Negative for dysuria, flank pain and hematuria.   Musculoskeletal:  Negative for arthralgias and myalgias.   Skin:  Negative for rash.   Neurological:  Negative for syncope, light-headedness and headaches.   All other systems reviewed and are negative.      Physical Exam     Initial Vitals [09/29/23 1947]   BP Pulse Resp Temp SpO2   112/73 89 16 98.2 °F (36.8 °C) 99 %      MAP       --         Physical Exam    Nursing note and vitals reviewed.  Constitutional: He appears well-developed and well-nourished. He is not diaphoretic. No distress.   HENT:   Head: Normocephalic and atraumatic.   Mouth/Throat: Oropharynx is clear and moist. No oropharyngeal exudate.   Eyes: Conjunctivae and EOM are normal.   Neck: Neck supple.   Normal range of motion.  Cardiovascular:  Normal rate, regular rhythm, normal heart sounds and intact distal pulses.           Pulmonary/Chest: Breath sounds normal. No respiratory distress.   Abdominal: Abdomen is soft and flat. Bowel sounds are normal. There is abdominal tenderness in the right lower quadrant and suprapubic area.   No right CVA tenderness.  No left CVA tenderness. There is no rebound, no guarding and no tenderness at McBurney's point.   Musculoskeletal:         General: No edema.      Cervical back: Normal range of motion and neck supple.     Neurological: He is alert and oriented to person, place, and time. GCS score is 15. GCS eye subscore is 4. GCS verbal subscore is 5. GCS motor subscore is 6.   Skin: Skin is warm and dry. Capillary refill takes less than 2 seconds. No rash noted.   Psychiatric: He has a normal mood and affect.         ED Course    Procedures  Labs Reviewed   COMPREHENSIVE METABOLIC PANEL - Abnormal; Notable for the following components:       Result Value    Globulin 3.6 (*)     Aspartate Aminotransferase 41 (*)     All other components within normal limits   URINALYSIS - Abnormal; Notable for the following components:    Protein, UA Trace (*)     Ketones, UA Trace (*)     Bacteria, UA Trace (*)     Squamous Epithelial Cells, UA Trace (*)     Mucous, UA Trace (*)     All other components within normal limits   CBC WITH DIFFERENTIAL - Abnormal; Notable for the following components:    RBC 3.88 (*)     Hgb 12.5 (*)     Hct 37.4 (*)     MCV 96.4 (*)     MCH 32.2 (*)     MPV 10.7 (*)     All other components within normal limits   LIPASE - Normal   CBC W/ AUTO DIFFERENTIAL    Narrative:     The following orders were created for panel order CBC auto differential.  Procedure                               Abnormality         Status                     ---------                               -----------         ------                     CBC with Differential[[id number]]        Abnormal            Final result                 Please view results for these tests on the individual orders.   URINALYSIS, REFLEX TO URINE CULTURE   EXTRA TUBES    Narrative:     The following orders were created for panel order EXTRA TUBES.  Procedure                               Abnormality         Status                     ---------                               -----------         ------                     Light Blue Top Hold[[id number]]                             In process                 Gold Top Hold[[id number]]                                   In process                   Please view results for these tests on the individual orders.   LIGHT BLUE TOP HOLD   GOLD TOP HOLD          Imaging Results              CT Abdomen Pelvis  Without Contrast (Preliminary result)  Result time 09/29/23 23:05:10      Preliminary result by Irvin Cruz MD (09/29/23 23:05:10)                    Narrative:    START OF REPORT:  Technique: CT of the abdomen and pelvis was performed with axial images as well as sagittal and coronal reconstruction images without intravenous contrast or oral contrast.    Comparison: None available.    Clinical History: Abdominal Pain (Pt arrives with c/o right side lower abd pain radiating to his back x1 week; pt denies any bowel or urinary dysfunction.    Dosage Information: Automated Exposure Control was utilized.    Findings:  Lines and Tubes: None.  Thorax:  Lungs: The visualized lung bases appear unremarkable.  Pleura: No pleural effusion is seen.  Heart: The heart size is within normal limits.  Abdomen:  Abdominal Wall: No abdominal wall pathology is seen.  Liver: The liver appears unremarkable.  Biliary System: No intrahepatic or extrahepatic biliary duct dilatation is seen.  Gallbladder: The gallbladder appears unremarkable.  Pancreas: The head and uncinate process of the pancreas appear relatively bulky with subtle surrounding fat stranding (series 2 image 47-61).  Spleen: The spleen appears unremarkable.  Adrenals: The adrenal glands appear unremarkable.  Kidneys: The kidneys appear unremarkable with no stones cysts masses or hydronephrosis.  Aorta: The abdominal aorta appears unremarkable.  IVC: Unremarkable.  Bowel:  Esophagus: The visualized esophagus appears unremarkable.  Stomach: The stomach appears unremarkable.  Duodenum: Unremarkable appearing duodenum.  Small Bowel: The small bowel appears unremarkable.  Colon: Nondistended.  Appendix: The appendix appears unremarkable (series 2 image ).  Peritoneum: No intraperitoneal free air or ascites is seen.    Pelvis:  Bladder: The bladder appears unremarkable.  Male:  Prostate gland: The prostate gland is mildly enlarged.    Bony structures:  Dorsal Spine: The visualized dorsal spine appears unremarkable.  Bony Pelvis: The visualized bony structures of the pelvis appear  unremarkable.      Impression:  1. The head and uncinate process of the pancreas appear relatively bulky with subtle surrounding fat stranding (series 2 image 47-61). This is consistent with pancreatitis. Correlate with clinical and laboratory findings.  2. Details and other findings as discussed above.                                         X-Ray Abdomen Flat And Erect (Final result)  Result time 09/29/23 21:46:43      Final result by Nigel Knox MD (09/29/23 21:46:43)                   Narrative:    EXAMINATION  XR ABDOMEN FLAT AND ERECT    CLINICAL HISTORY  Unspecified abdominal pain    TECHNIQUE  A total of 2 images submitted of the abdomen.    COMPARISON  29 March 2022    FINDINGS  Lines/tubes/devices: none present    There is no evidence of free intraperitoneal air beneath the hemidiaphragms.  Nonspecific bowel gas pattern is present, with no abnormal air-fluid levels or findings to suggest mechanical bowel obstruction.  No intra-abdominal mass effect is evident.    The visualized lung bases and cardiac contour are unremarkable. Included osseous structures are without acute abnormality.    IMPRESSION  No acute intra-abdominal abnormality.      Electronically signed by: Nigel Knox  Date:    09/29/2023  Time:    21:46                                     Medications   ondansetron disintegrating tablet 4 mg (4 mg Oral Given 9/29/23 2108)   ketorolac injection 15 mg (15 mg Intramuscular Given 9/29/23 2107)     Medical Decision Making  Patient with intermittent RLQ abdominal pain.     Ddx: appendicitis, cystitis, constipation, incarcerated/strangulated hernia, amongst others     ED management:  Given p.o. Zofran and IM Toradol, symptoms improved.    ED course:  Due to waxing and waning nature of pain noncontrast CT ordered to rule out kidney stone.  No evidence of kidney stone but CT does reveal changes consistent with pancreatitis.  Lipase within normal limits at this time.  No epigastric tenderness on exam  and no rebound, guarding, rigidity or distention.  Patient has a history of alcohol-induced chronic pancreatitis and does admit to drinking small amounts of alcohol recently.  CBC reveals mild macrocytic anemia consistent with alcohol abuse.  CMP reveals mild elevation of AST, otherwise unremarkable.  UA unremarkable with low suspicion for infectious process.  Patient able to tolerate p.o. meds and water in ED. nontoxic appearing with unremarkable vitals, stable for discharge we will send home with meds for symptom relief.  Recommended BRAT diet for next 3 days.  Discussed importance of complete alcohol cessation.  Patient states he does not feel like he has a problem at this time and denies need for rehab/detox services.  Referral placed to IM clinic to establish PCP.  All questions answered and all test results explained.  ED precautions given for new or worsening symptoms and patient verbalized understanding.    Amount and/or Complexity of Data Reviewed  Labs: ordered. Decision-making details documented in ED Course.  Radiology: ordered and independent interpretation performed. Decision-making details documented in ED Course.    Risk  Prescription drug management.               ED Course as of 09/30/23 0102   Fri Sep 29, 2023   2059 Care transitioned to Shree Tarango at 9pm [SA]   2100 Assumed care of patient from Rae Barnes PA-C [NB]      ED Course User Index  [NB] Jeff Tarango PA  [SA] Rae Howard PA                      Clinical Impression:   Final diagnoses:  [R10.9] Abdominal pain  [K86.0] Alcohol-induced chronic pancreatitis (Primary)        ED Disposition Condition    Discharge Good          ED Prescriptions       Medication Sig Dispense Start Date End Date Auth. Provider    ondansetron (ZOFRAN) 4 MG tablet Take 1 tablet (4 mg total) by mouth every 6 (six) hours. 12 tablet 9/30/2023 -- Jeff Tarango PA    indomethacin (INDOCIN) 25 MG capsule Take 1 capsule (25 mg total) by mouth 3  (three) times daily as needed. 15 capsule 9/30/2023 -- Jeff Tarango PA          Follow-up Information       Follow up With Specialties Details Why Contact Info Additional Information    [facility] Internal Medicine In 2 weeks  [address] [facility] [address] [phone] Internal Medicine Clinic Entrance #1    [facility] Emergency [facility] Emergency Medicine  As needed, If symptoms worsen [address] [facility] [address] [phone]              Jeff Tarango PA  09/30/23 0102

## 2023-11-26 ENCOUNTER — HOSPITAL ENCOUNTER (EMERGENCY)
Facility: HOSPITAL | Age: 48
Discharge: HOME | End: 2023-11-26
Attending: EMERGENCY MEDICINE

## 2023-11-26 VITALS
OXYGEN SATURATION: 99 % | SYSTOLIC BLOOD PRESSURE: 117 MMHG | HEIGHT: 68 IN | DIASTOLIC BLOOD PRESSURE: 80 MMHG | HEART RATE: 78 BPM | RESPIRATION RATE: 20 BRPM | WEIGHT: 149.94 LBS | BODY MASS INDEX: 22.72 KG/M2 | TEMPERATURE: 99 F

## 2023-11-26 DIAGNOSIS — B97.89 VIRAL RESPIRATORY ILLNESS: ICD-10-CM

## 2023-11-26 DIAGNOSIS — J98.8 VIRAL RESPIRATORY ILLNESS: ICD-10-CM

## 2023-11-26 DIAGNOSIS — J02.9 SORE THROAT: Primary | ICD-10-CM

## 2023-11-26 LAB
FLUAV AG UPPER RESP QL IA.RAPID: NOT DETECTED
FLUBV AG UPPER RESP QL IA.RAPID: NOT DETECTED
SARS-COV-2 RNA RESP QL NAA+PROBE: NOT DETECTED
STREP A PCR (OHS): NOT DETECTED

## 2023-11-26 PROCEDURE — 25000003 PHARM REV CODE 250: Performed by: PHYSICIAN ASSISTANT

## 2023-11-26 PROCEDURE — 99283 EMERGENCY DEPT VISIT LOW MDM: CPT

## 2023-11-26 PROCEDURE — 0240U COVID/FLU A&B PCR: CPT | Performed by: PHYSICIAN ASSISTANT

## 2023-11-26 PROCEDURE — 87651 STREP A DNA AMP PROBE: CPT | Performed by: PHYSICIAN ASSISTANT

## 2023-11-26 PROCEDURE — 63600175 PHARM REV CODE 636 W HCPCS: Performed by: PHYSICIAN ASSISTANT

## 2023-11-26 RX ORDER — IBUPROFEN 400 MG/1
800 TABLET ORAL
Status: COMPLETED | OUTPATIENT
Start: 2023-11-26 | End: 2023-11-26

## 2023-11-26 RX ORDER — PREDNISONE 20 MG/1
20 TABLET ORAL DAILY
Qty: 3 TABLET | Refills: 0 | Status: SHIPPED | OUTPATIENT
Start: 2023-11-26 | End: 2023-11-29 | Stop reason: ALTCHOICE

## 2023-11-26 RX ORDER — PREDNISONE 10 MG/1
40 TABLET ORAL
Status: COMPLETED | OUTPATIENT
Start: 2023-11-26 | End: 2023-11-26

## 2023-11-26 RX ORDER — CETIRIZINE HYDROCHLORIDE 10 MG/1
10 TABLET ORAL DAILY
Qty: 14 TABLET | Refills: 0 | Status: SHIPPED | OUTPATIENT
Start: 2023-11-26 | End: 2023-11-29 | Stop reason: ALTCHOICE

## 2023-11-26 RX ORDER — AZELASTINE 1 MG/ML
1 SPRAY, METERED NASAL 2 TIMES DAILY
Qty: 30 ML | Refills: 0 | Status: SHIPPED | OUTPATIENT
Start: 2023-11-26 | End: 2023-11-29 | Stop reason: ALTCHOICE

## 2023-11-26 RX ADMIN — PREDNISONE 40 MG: 10 TABLET ORAL at 12:11

## 2023-11-26 RX ADMIN — IBUPROFEN 800 MG: 400 TABLET, FILM COATED ORAL at 12:11

## 2023-11-26 NOTE — DISCHARGE INSTRUCTIONS
Take medications as prescribed with food and water.  Stay well hydrated drinking plenty of water daily.  Take multi vitamin, Vit C and Zinc daily.   Return to ED with any concerning symptoms.  Alternate Tylenol and Ibuprofen for body aches and fever.  Follow up with your primary care provider within 2-3 days.      Do not take prednisone until tomorrow.  A dose was given in the ED.

## 2023-11-26 NOTE — ED PROVIDER NOTES
Encounter Date: 2023       History     Chief Complaint   Patient presents with    Sore Throat    Headache    sneezing     C/o above symptoms since last pm.      48-year-old male presents emergency department complaints of sore throat, headache and sneezing and nasal congestion that began last night.  He is still eating and drinking.  He denies body aches, cough, fever, chills, nausea, vomiting, abdominal pain, shortness of breath, chest pain.    The history is provided by the patient. No  was used.     Review of patient's allergies indicates:  No Known Allergies  Past Medical History:   Diagnosis Date    Alcoholic pancreatitis     Alcoholism     Disease of pancreas, unspecified     Marijuana abuse      Past Surgical History:   Procedure Laterality Date    CLOSED REDUCTION OF FRACTURE OF NASAL BONE Bilateral 2022    Procedure: CLOSED REDUCTION, FRACTURE, NASAL BONE;  Surgeon: Donn Schwartz MD;  Location: [facility];  Service: ENT;  Laterality: Bilateral;    CLOSED REDUCTION OF FRACTURE OF NASAL BONE Bilateral 2023    Procedure: CLOSED REDUCTION, FRACTURE, NASAL BONE;  Surgeon: Donn Schwartz MD;  Location: [facility];  Service: ENT;  Laterality: Bilateral;    HERNIA REPAIR       Family History   Problem Relation Age of Onset    Hypertension Mother     Pancreatic cancer Father     Alcohol abuse Father      Social History     Tobacco Use    Smoking status: Every Day     Current packs/day: 0.00     Average packs/day: 0.5 packs/day for 2.0 years (1.0 ttl pk-yrs)     Types: Cigarettes     Start date: 2020     Last attempt to quit: 2022     Years since quittin.4    Smokeless tobacco: Never   Substance Use Topics    Alcohol use: Yes     Alcohol/week: 2.0 standard drinks of alcohol     Types: 2 Cans of beer per week     Comment: on weekends    Drug use: Yes     Frequency: 3.0 times per week     Types: Marijuana     Comment: monthly     Review of Systems   Constitutional:   Negative for appetite change, chills and fever.   HENT:  Positive for congestion, sneezing and sore throat. Negative for ear pain.    Eyes: Negative.    Respiratory:  Negative for cough and shortness of breath.    Cardiovascular:  Negative for chest pain and palpitations.   Gastrointestinal:  Negative for abdominal pain, nausea and vomiting.   Genitourinary:  Negative for dysuria and flank pain.   Musculoskeletal:  Negative for back pain, myalgias and neck pain.   Skin:  Negative for rash.   Neurological:  Positive for headaches. Negative for dizziness and light-headedness.       Physical Exam     Initial Vitals [11/26/23 1040]   BP Pulse Resp Temp SpO2   117/80 78 20 98.6 °F (37 °C) 99 %      MAP       --         Physical Exam    Nursing note and vitals reviewed.  Constitutional: He appears well-developed and well-nourished.   HENT:   Right Ear: External ear normal.   Left Ear: External ear normal.   Nose: Mucosal edema present.   Mouth/Throat: Uvula is midline and oropharynx is clear and moist. No posterior oropharyngeal edema or posterior oropharyngeal erythema.   Eyes: Conjunctivae are normal.   Neck: Neck supple.   Normal range of motion.  Cardiovascular:  Normal rate, normal heart sounds and intact distal pulses.           Pulmonary/Chest: Breath sounds normal. No respiratory distress. He has no wheezes. He has no rhonchi. He has no rales.   Abdominal: Abdomen is soft. Bowel sounds are normal. There is no abdominal tenderness.   Musculoskeletal:         General: Normal range of motion.      Cervical back: Normal range of motion and neck supple.     Lymphadenopathy:     He has no cervical adenopathy.   Neurological: He is alert. GCS score is 15. GCS eye subscore is 4. GCS verbal subscore is 5. GCS motor subscore is 6.   Skin: Skin is warm. Capillary refill takes less than 2 seconds.         ED Course   Procedures  Labs Reviewed   COVID/FLU A&B PCR - Normal    Narrative:     The Xpert Xpress SARS-CoV-2/FLU/RSV  plus is a rapid, multiplexed real-time PCR test intended for the simultaneous qualitative detection and differentiation of SARS-CoV-2, Influenza A, Influenza B, and respiratory syncytial virus (RSV) viral RNA in either nasopharyngeal swab or nasal swab specimens.         STREP GROUP A BY PCR - Normal    Narrative:     The Xpert Xpress Strep A test is a rapid, qualitative in vitro diagnostic test for the detection of Streptococcus pyogenes (Group A ß-hemolytic Streptococcus, Strep A) in throat swab specimens from patients with signs and symptoms of pharyngitis.            Imaging Results    None          Medications   predniSONE tablet 40 mg (40 mg Oral Given 11/26/23 1255)   ibuprofen tablet 800 mg (800 mg Oral Given 11/26/23 1255)     Medical Decision Making  48-year-old male presents emergency department complaints of sore throat, headache and sneezing and nasal congestion that began last night.  He is still eating and drinking.  He denies body aches, cough, fever, chills, nausea, vomiting, abdominal pain, shortness of breath, chest pain.    DDx:  COVID, influenza, strep pharyngitis    Nasal oral swabs negative.  Will treat symptoms for viral pharyngitis.    The patient is resting comfortably and in no acute distress.  I personally discussed his test results and treatment plan.  Gave strict ED precautions and specific conditions for return to the emergency department and importance of follow up with pcp.  Patient voices understanding and agrees to the plan discussed. All patients' questions have been answered at this time. He has remained hemodynamically stable throughout entire stay in ED and is stable for discharge home.    Amount and/or Complexity of Data Reviewed  Labs: ordered. Decision-making details documented in ED Course.    Risk  OTC drugs.  Prescription drug management.               ED Course as of 11/26/23 2115   Sun Nov 26, 2023   1232 STREP A PCR ([facility]): Not Detected [ER]   1232 Influenza A,  Molecular: Not Detected [ER]   1232 Influenza B, Molecular: Not Detected [ER]   1232 SARS-CoV2 (COVID-19) Qualitative PCR: Not Detected [ER]      ED Course User Index  [ER] Rose Marie Landaverde PA                        Clinical Impression:  Final diagnoses:  [J02.9] Sore throat (Primary)  [J98.8, B97.89] Viral respiratory illness          ED Disposition Condition    Discharge Stable          ED Prescriptions       Medication Sig Dispense Start Date End Date Auth. Provider    cetirizine (ZYRTEC) 10 MG tablet Take 1 tablet (10 mg total) by mouth once daily. for 14 days 14 tablet 11/26/2023 12/10/2023 Rose Marie Landaverde PA    azelastine (ASTELIN) 137 mcg (0.1 %) nasal spray 1 spray (137 mcg total) by Nasal route 2 (two) times daily. for 7 days 30 mL 11/26/2023 12/3/2023 Rose Marie Landaverde PA    predniSONE (DELTASONE) 20 MG tablet Take 1 tablet (20 mg total) by mouth once daily. for 3 days 3 tablet 11/26/2023 11/29/2023 Rose Marie Landaverde PA          Follow-up Information       Follow up With Specialties Details Why Contact Info    [facility] Emergency Medicine  As needed, If symptoms worsen [address] [facility] [address] [phone]             Rose Marie Landaverde PA  11/26/23 [id number]

## 2023-11-26 NOTE — Clinical Note
"Herminio Weston" Ely was seen and treated in our emergency department on 11/26/2023.  He may return to work on 11/28/2023.       If you have any questions or concerns, please don't hesitate to call.      Rose Marie Landaverde PA"

## 2023-11-29 ENCOUNTER — OFFICE VISIT (OUTPATIENT)
Dept: INTERNAL MEDICINE | Facility: CLINIC | Age: 48
End: 2023-11-29

## 2023-11-29 VITALS
TEMPERATURE: 98 F | SYSTOLIC BLOOD PRESSURE: 113 MMHG | HEART RATE: 76 BPM | BODY MASS INDEX: 23.95 KG/M2 | DIASTOLIC BLOOD PRESSURE: 74 MMHG | WEIGHT: 158 LBS | HEIGHT: 68 IN

## 2023-11-29 DIAGNOSIS — Z12.11 ENCOUNTER FOR COLORECTAL CANCER SCREENING: ICD-10-CM

## 2023-11-29 DIAGNOSIS — Z78.9 ALCOHOL USE: Chronic | ICD-10-CM

## 2023-11-29 DIAGNOSIS — F17.210 CIGARETTE NICOTINE DEPENDENCE WITHOUT COMPLICATION: Chronic | ICD-10-CM

## 2023-11-29 DIAGNOSIS — F12.10 MARIJUANA ABUSE: Chronic | ICD-10-CM

## 2023-11-29 DIAGNOSIS — Z00.00 WELLNESS EXAMINATION: Primary | ICD-10-CM

## 2023-11-29 DIAGNOSIS — Z12.12 ENCOUNTER FOR COLORECTAL CANCER SCREENING: ICD-10-CM

## 2023-11-29 PROBLEM — F10.90 ALCOHOL USE: Chronic | Status: ACTIVE | Noted: 2023-11-29

## 2023-11-29 PROBLEM — Z72.0 TOBACCO USER: Status: ACTIVE | Noted: 2023-11-29

## 2023-11-29 PROCEDURE — 99406 PR TOBACCO USE CESSATION INTERMEDIATE 3-10 MINUTES: ICD-10-PCS | Mod: S$PBB,,, | Performed by: NURSE PRACTITIONER

## 2023-11-29 PROCEDURE — 99396 PR PREVENTIVE VISIT,EST,40-64: ICD-10-PCS | Mod: 25,S$PBB,, | Performed by: NURSE PRACTITIONER

## 2023-11-29 PROCEDURE — 99406 BEHAV CHNG SMOKING 3-10 MIN: CPT | Mod: S$PBB,,, | Performed by: NURSE PRACTITIONER

## 2023-11-29 PROCEDURE — 99396 PREV VISIT EST AGE 40-64: CPT | Mod: 25,S$PBB,, | Performed by: NURSE PRACTITIONER

## 2023-11-29 PROCEDURE — 99214 OFFICE O/P EST MOD 30 MIN: CPT | Mod: PBBFAC | Performed by: NURSE PRACTITIONER

## 2023-11-29 NOTE — PROGRESS NOTES
Patient ID: [id number]     Chief Complaint: Establish Care    HPI:     Herminio Graves Sr. is a 48 y.o. male with diagnosis of Alcohol/Marijuana Use. Patient seen in clinic today to establish care.   Patient denies any acute complaints. Here to establish care. Patient is a currently everyday tobacco user.     Over the last two weeks how often have you been bothered by little interest or pleasure in doing things: 0  Over the last two weeks how often have you been bothered by feeling down, depressed or hopeless: 0  PHQ-2 Total Score: 0    The 10-year ASCVD risk score (Fariha FELICIANO, et al., 2019) is: 5.7%*    Values used to calculate the score:      Age: 48 years      Sex: Male      Is Non- : Yes      Diabetic: No      Tobacco smoker: Yes      Systolic Blood Pressure: 113 mmHg      Is BP treated: No      HDL Cholesterol: 56 mg/dL*      Total Cholesterol: 161 mg/dL*      * - Cholesterol units were assumed for this score calculation    Review of patient's allergies indicates:  No Known Allergies    Breast Cancer Screening: N/A  Cervical Cancer Screening: N/A  Colorectal Cancer Screening: Cologuard ordered  Diabetic Eye Exam: N/A  Diabetic Foot Exam: N/A  Lung Cancer Screening: N/A  Prostate Cancer Screening: N/A  AAA Screening: deferred due to age  Osteoporosis Screening: N/A  Medicare Wellness: N/A  Immunizations:   There is no immunization history on file for this patient.    Past Surgical History:   Procedure Laterality Date    CLOSED REDUCTION OF FRACTURE OF NASAL BONE Bilateral 6/27/2022    Procedure: CLOSED REDUCTION, FRACTURE, NASAL BONE;  Surgeon: Donn Schwartz MD;  Location: [facility];  Service: ENT;  Laterality: Bilateral;    CLOSED REDUCTION OF FRACTURE OF NASAL BONE Bilateral 2/24/2023    Procedure: CLOSED REDUCTION, FRACTURE, NASAL BONE;  Surgeon: Donn Schwartz MD;  Location: [facility];  Service: ENT;  Laterality: Bilateral;    HERNIA REPAIR       family history includes Alcohol abuse  in his father; Heart attack in his brother; Hypertension in his mother; Pancreatic cancer in his father and paternal grandmother.    Social History     Socioeconomic History    Marital status: Unknown   Tobacco Use    Smoking status: Every Day     Current packs/day: 0.00     Average packs/day: 0.5 packs/day for 2.0 years (1.0 ttl pk-yrs)     Types: Cigarettes     Start date: 2020     Last attempt to quit: 2022     Years since quittin.4    Smokeless tobacco: Never   Substance and Sexual Activity    Alcohol use: Yes     Alcohol/week: 2.0 standard drinks of alcohol     Types: 2 Cans of beer per week     Comment: on weekends    Drug use: Yes     Frequency: 3.0 times per week     Types: Marijuana     Comment: monthly    Sexual activity: Yes     Partners: Female     Social Determinants of Health     Financial Resource Strain: Low Risk  (2023)    Overall Financial Resource Strain (CARDIA)     Difficulty of Paying Living Expenses: Not very hard   Food Insecurity: Food Insecurity Present (2023)    Hunger Vital Sign     Worried About Running Out of Food in the Last Year: Sometimes true     Ran Out of Food in the Last Year: Sometimes true   Transportation Needs: No Transportation Needs (2023)    PRAPARE - Transportation     Lack of Transportation (Medical): No     Lack of Transportation (Non-Medical): No   Physical Activity: Insufficiently Active (2023)    Exercise Vital Sign     Days of Exercise per Week: 3 days     Minutes of Exercise per Session: 40 min   Stress: No Stress Concern Present (2023)    [employer] - Occupational Stress Questionnaire     Feeling of Stress : Not at all   Social Connections: Moderately Isolated (2023)    Social Connection and Isolation Panel [NHANES]     Frequency of Communication with Friends and Family: More than three times a week     Frequency of Social Gatherings with Friends and Family: Once a week     Attends  "[employer]ahai Services: More than 4 times per year     Active Member of Clubs or Organizations: No     Attends Club or Organization Meetings: Never     Marital Status:    Housing Stability: Low Risk  (11/29/2023)    Housing Stability Vital Sign     Unable to Pay for Housing in the Last Year: No     Number of Places Lived in the Last Year: 1     Unstable Housing in the Last Year: No     Current Outpatient Medications   Medication Instructions    acetaminophen (TYLENOL) 650 mg, Oral, Every 8 hours       Subjective:     Review of Systems   Constitutional: Negative.    HENT: Negative.     Eyes: Negative.    Respiratory: Negative.     Cardiovascular: Negative.    Gastrointestinal: Negative.    Endocrine: Negative.    Genitourinary: Negative.    Musculoskeletal: Negative.    Skin: Negative.    Allergic/Immunologic: Negative.    Neurological: Negative.    Hematological: Negative.    Psychiatric/Behavioral: Negative.         Objective:     Visit Vitals  /74 (BP Location: Left arm, Patient Position: Sitting, BP Method: Large (Automatic))   Pulse 76   Temp 98 °F (36.7 °C) (Oral)   Ht 5' 7.99" (1.727 m)   Wt 71.7 kg (158 lb)   BMI 24.03 kg/m²       Physical Exam  Vitals reviewed.   Constitutional:       Appearance: Normal appearance.   HENT:      Head: Normocephalic and atraumatic.      Mouth/Throat:      Mouth: Mucous membranes are moist.      Pharynx: Oropharynx is clear.   Eyes:      Extraocular Movements: Extraocular movements intact.      Conjunctiva/sclera: Conjunctivae normal.      Pupils: Pupils are equal, round, and reactive to light.   Cardiovascular:      Rate and Rhythm: Normal rate and regular rhythm.      Heart sounds: Normal heart sounds.   Pulmonary:      Effort: Pulmonary effort is normal.      Breath sounds: Normal breath sounds.   Abdominal:      General: Bowel sounds are normal.   Musculoskeletal:         General: Normal range of motion.      Cervical back: Normal range of motion.   Skin:     " General: Skin is warm and dry.   Neurological:      Mental Status: He is alert and oriented to person, place, and time.   Psychiatric:         Mood and Affect: Mood normal.         Behavior: Behavior normal.       Labs Reviewed:     Hematology:  Lab Results   Component Value Date    WBC 5.68 09/29/2023    HGB 12.5 (L) 09/29/2023    HCT 37.4 (L) 09/29/2023     09/29/2023     Chemistry:  Lab Results   Component Value Date     09/29/2023    K 4.2 09/29/2023    CHLORIDE 106 09/29/2023    BUN 12.1 09/29/2023    CREATININE 1.11 09/29/2023    EGFRNORACEVR >60 09/29/2023    GLUCOSE 87 09/29/2023    CALCIUM 9.2 09/29/2023    ALKPHOS 67 09/29/2023    LABPROT 7.6 09/29/2023    ALBUMIN 4.0 09/29/2023    BILIDIR 0.4 03/31/2022    IBILI 0.60 03/31/2022    AST 41 (H) 09/29/2023    ALT 33 09/29/2023    MG 2.20 03/30/2022    PHOS 3.6 03/30/2022     Lab Results   Component Value Date    HGBA1C 4.8 09/11/2021     Lipid Panel:  Lab Results   Component Value Date    CHOL 161 03/30/2022    HDL 56 03/30/2022    LDL 89.00 03/30/2022    TRIG 78 03/30/2022    TOTALCHOLEST 3 03/30/2022     Thyroid:  Lab Results   Component Value Date    TSH 1.3994 09/10/2021     Urine:  Lab Results   Component Value Date    COLORUA Yellow 09/29/2023    APPEARANCEUA Clear 09/29/2023    SGUA 1.028 09/29/2023    PHUA 5.0 09/29/2023    PROTEINUA Trace (A) 09/29/2023    GLUCOSEUA Normal 09/29/2023    KETONESUA Trace (A) 09/29/2023    BLOODUA Negative 09/29/2023    NITRITESUA Negative 09/29/2023    LEUKOCYTESUR Negative 09/29/2023    RBCUA 0-5 09/29/2023    WBCUA 0-5 09/29/2023    BACTERIA Trace (A) 09/29/2023    SQEPUA Trace (A) 09/29/2023    HYALINECASTS None Seen 09/29/2023    CREATRANDUR 197.7 (H) 09/10/2021    PROTEINURINE 132.0 09/10/2021        Assessment:       ICD-10-CM ICD-9-CM   1. Wellness examination  Z00.00 V70.0   2. Encounter for colorectal cancer screening  Z12.11 V76.51    Z12.12 V76.41   3. Alcohol use  Z78.9 V49.89   4. Marijuana  abuse  F12.10 305.20   5. Cigarette nicotine dependence without complication  F17.210 305.1        Plan:     1. Wellness examination  Labs ordered  Optometrist: recommend yearly  Dentist: recommend Q6 months  Cologuard ordered   - CBC Auto Differential; Future  - Comprehensive Metabolic Panel; Future  - Hemoglobin A1C; Future  - Lipid Panel; Future  - Urinalysis; Future  - TSH; Future  - Urinalysis    2. Encounter for colorectal cancer screening  - Cologuard Screening (Multitarget Stool DNA); Future  - Cologuard Screening (Multitarget Stool DNA)    3. Alcohol use  Encouraged cessation    4. Marijuana abuse  Encouraged cessation    5. Cigarette nicotine dependence without complication  Smoking cessation education provided, encouraged. Informed of smoking cessation program. Patient refused smoking cessation at this time. I spent 3 minutes discussing smoking cessation with patient.       Follow up in about 2 weeks (around 12/13/2023) for Labs. In addition to their scheduled follow up, the patient has also been instructed to follow up on as needed basis.     Lauren Bae, ELVIRA

## 2024-05-11 ENCOUNTER — HOSPITAL ENCOUNTER (EMERGENCY)
Facility: HOSPITAL | Age: 49
Discharge: HOME | End: 2024-05-11
Attending: EMERGENCY MEDICINE

## 2024-05-11 VITALS
WEIGHT: 153 LBS | RESPIRATION RATE: 20 BRPM | HEIGHT: 68 IN | TEMPERATURE: 98 F | HEART RATE: 81 BPM | SYSTOLIC BLOOD PRESSURE: 122 MMHG | BODY MASS INDEX: 23.19 KG/M2 | OXYGEN SATURATION: 100 % | DIASTOLIC BLOOD PRESSURE: 86 MMHG

## 2024-05-11 DIAGNOSIS — R10.9 ABDOMINAL PAIN, UNSPECIFIED ABDOMINAL LOCATION: ICD-10-CM

## 2024-05-11 DIAGNOSIS — K86.1 CHRONIC PANCREATITIS, UNSPECIFIED PANCREATITIS TYPE: Primary | ICD-10-CM

## 2024-05-11 LAB
ALBUMIN SERPL-MCNC: 4 G/DL (ref 3.5–5)
ALBUMIN/GLOB SERPL: 1.1 RATIO (ref 1.1–2)
ALP SERPL-CCNC: 81 UNIT/L (ref 40–150)
ALT SERPL-CCNC: 101 UNIT/L (ref 0–55)
AMPHET UR QL SCN: NEGATIVE
AST SERPL-CCNC: 63 UNIT/L (ref 5–34)
BACTERIA #/AREA URNS AUTO: ABNORMAL /HPF
BARBITURATE SCN PRESENT UR: NEGATIVE
BASOPHILS # BLD AUTO: 0.02 X10(3)/MCL
BASOPHILS NFR BLD AUTO: 0.3 %
BENZODIAZ UR QL SCN: NEGATIVE
BILIRUB SERPL-MCNC: 0.9 MG/DL
BILIRUB UR QL STRIP.AUTO: NEGATIVE
BUN SERPL-MCNC: 16.2 MG/DL (ref 8.9–20.6)
CALCIUM SERPL-MCNC: 9.4 MG/DL (ref 8.4–10.2)
CANNABINOIDS UR QL SCN: POSITIVE
CHLORIDE SERPL-SCNC: 108 MMOL/L (ref 98–107)
CLARITY UR: CLEAR
CO2 SERPL-SCNC: 21 MMOL/L (ref 22–29)
COCAINE UR QL SCN: NEGATIVE
COLOR UR AUTO: YELLOW
CREAT SERPL-MCNC: 1.07 MG/DL (ref 0.73–1.18)
EOSINOPHIL # BLD AUTO: 0.07 X10(3)/MCL (ref 0–0.9)
EOSINOPHIL NFR BLD AUTO: 1.1 %
ERYTHROCYTE [DISTWIDTH] IN BLOOD BY AUTOMATED COUNT: 13.6 % (ref 11.5–17)
ETHANOL SERPL-MCNC: <10 MG/DL
FENTANYL UR QL SCN: NEGATIVE
GFR SERPLBLD CREATININE-BSD FMLA CKD-EPI: >60 ML/MIN/1.73/M2
GLOBULIN SER-MCNC: 3.7 GM/DL (ref 2.4–3.5)
GLUCOSE SERPL-MCNC: 95 MG/DL (ref 74–100)
GLUCOSE UR QL STRIP: NORMAL
HCT VFR BLD AUTO: 36.5 % (ref 42–52)
HGB BLD-MCNC: 12.7 G/DL (ref 14–18)
HGB UR QL STRIP: NEGATIVE
HOLD SPECIMEN: NORMAL
HYALINE CASTS #/AREA URNS LPF: ABNORMAL /LPF
IMM GRANULOCYTES # BLD AUTO: 0.01 X10(3)/MCL (ref 0–0.04)
IMM GRANULOCYTES NFR BLD AUTO: 0.2 %
KETONES UR QL STRIP: ABNORMAL
LEUKOCYTE ESTERASE UR QL STRIP: NEGATIVE
LIPASE SERPL-CCNC: 36 U/L
LYMPHOCYTES # BLD AUTO: 2.26 X10(3)/MCL (ref 0.6–4.6)
LYMPHOCYTES NFR BLD AUTO: 34.1 %
MAGNESIUM SERPL-MCNC: 2.5 MG/DL (ref 1.6–2.6)
MCH RBC QN AUTO: 33.5 PG (ref 27–31)
MCHC RBC AUTO-ENTMCNC: 34.8 G/DL (ref 33–36)
MCV RBC AUTO: 96.3 FL (ref 80–94)
MDMA UR QL SCN: NEGATIVE
MONOCYTES # BLD AUTO: 0.62 X10(3)/MCL (ref 0.1–1.3)
MONOCYTES NFR BLD AUTO: 9.4 %
MUCOUS THREADS URNS QL MICRO: ABNORMAL /LPF
NEUTROPHILS # BLD AUTO: 3.65 X10(3)/MCL (ref 2.1–9.2)
NEUTROPHILS NFR BLD AUTO: 54.9 %
NITRITE UR QL STRIP: NEGATIVE
NRBC BLD AUTO-RTO: 0 %
OPIATES UR QL SCN: NEGATIVE
PCP UR QL: NEGATIVE
PH UR STRIP: 5.5 [PH]
PH UR: 5.5 [PH] (ref 3–11)
PLATELET # BLD AUTO: 178 X10(3)/MCL (ref 130–400)
PMV BLD AUTO: 10.4 FL (ref 7.4–10.4)
POTASSIUM SERPL-SCNC: 3.9 MMOL/L (ref 3.5–5.1)
PROT SERPL-MCNC: 7.7 GM/DL (ref 6.4–8.3)
PROT UR QL STRIP: ABNORMAL
RBC # BLD AUTO: 3.79 X10(6)/MCL (ref 4.7–6.1)
RBC #/AREA URNS AUTO: ABNORMAL /HPF
SODIUM SERPL-SCNC: 139 MMOL/L (ref 136–145)
SP GR UR STRIP.AUTO: 1.03 (ref 1–1.03)
SPECIFIC GRAVITY, URINE AUTO (.000) (OHS): 1.03 (ref 1–1.03)
SQUAMOUS #/AREA URNS LPF: ABNORMAL /HPF
UROBILINOGEN UR STRIP-ACNC: ABNORMAL
WBC # SPEC AUTO: 6.63 X10(3)/MCL (ref 4.5–11.5)
WBC #/AREA URNS AUTO: ABNORMAL /HPF

## 2024-05-11 PROCEDURE — 80307 DRUG TEST PRSMV CHEM ANLYZR: CPT | Performed by: EMERGENCY MEDICINE

## 2024-05-11 PROCEDURE — 83690 ASSAY OF LIPASE: CPT | Performed by: EMERGENCY MEDICINE

## 2024-05-11 PROCEDURE — 80053 COMPREHEN METABOLIC PANEL: CPT | Performed by: EMERGENCY MEDICINE

## 2024-05-11 PROCEDURE — 82077 ASSAY SPEC XCP UR&BREATH IA: CPT | Performed by: EMERGENCY MEDICINE

## 2024-05-11 PROCEDURE — 85025 COMPLETE CBC W/AUTO DIFF WBC: CPT | Performed by: EMERGENCY MEDICINE

## 2024-05-11 PROCEDURE — 99285 EMERGENCY DEPT VISIT HI MDM: CPT | Mod: 25

## 2024-05-11 PROCEDURE — 63600175 PHARM REV CODE 636 W HCPCS: Performed by: EMERGENCY MEDICINE

## 2024-05-11 PROCEDURE — 96361 HYDRATE IV INFUSION ADD-ON: CPT

## 2024-05-11 PROCEDURE — 83735 ASSAY OF MAGNESIUM: CPT | Performed by: EMERGENCY MEDICINE

## 2024-05-11 PROCEDURE — 25500020 PHARM REV CODE 255

## 2024-05-11 PROCEDURE — 96375 TX/PRO/DX INJ NEW DRUG ADDON: CPT

## 2024-05-11 PROCEDURE — 25000003 PHARM REV CODE 250: Performed by: EMERGENCY MEDICINE

## 2024-05-11 PROCEDURE — 96374 THER/PROPH/DIAG INJ IV PUSH: CPT

## 2024-05-11 PROCEDURE — 81001 URINALYSIS AUTO W/SCOPE: CPT | Mod: XB | Performed by: EMERGENCY MEDICINE

## 2024-05-11 RX ORDER — DROPERIDOL 2.5 MG/ML
0.62 INJECTION, SOLUTION INTRAMUSCULAR; INTRAVENOUS
Status: COMPLETED | OUTPATIENT
Start: 2024-05-11 | End: 2024-05-11

## 2024-05-11 RX ORDER — ONDANSETRON 4 MG/1
4 TABLET, ORALLY DISINTEGRATING ORAL 2 TIMES DAILY
Qty: 12 TABLET | Refills: 0 | Status: SHIPPED | OUTPATIENT
Start: 2024-05-11

## 2024-05-11 RX ORDER — KETOROLAC TROMETHAMINE 30 MG/ML
30 INJECTION, SOLUTION INTRAMUSCULAR; INTRAVENOUS
Status: COMPLETED | OUTPATIENT
Start: 2024-05-11 | End: 2024-05-11

## 2024-05-11 RX ADMIN — ALUMINUM HYDROXIDE AND MAGNESIUM HYDROXIDE 30 ML: 200; 200 SUSPENSION ORAL at 09:05

## 2024-05-11 RX ADMIN — DROPERIDOL 0.62 MG: 2.5 INJECTION, SOLUTION INTRAMUSCULAR; INTRAVENOUS at 09:05

## 2024-05-11 RX ADMIN — IOHEXOL 100 ML: 350 INJECTION, SOLUTION INTRAVENOUS at 09:05

## 2024-05-11 RX ADMIN — KETOROLAC TROMETHAMINE 30 MG: 30 INJECTION, SOLUTION INTRAMUSCULAR; INTRAVENOUS at 09:05

## 2024-05-11 RX ADMIN — SODIUM CHLORIDE 1000 ML: 9 INJECTION, SOLUTION INTRAVENOUS at 09:05

## 2024-05-11 NOTE — Clinical Note
"Herminio Weston" Ely was seen and treated in our emergency department on 5/11/2024.  He may return to work on 05/13/2024.       If you have any questions or concerns, please don't hesitate to call.      Hernan LYLES    "

## 2024-05-12 NOTE — ED PROVIDER NOTES
"Encounter Date: 2024       History     Chief Complaint   Patient presents with    Abdominal Pain     States severe abdominal pain x 3 days.  States had "a bout of diarrhea Friday morning but none since".       Patient here with mid abdominal pain, constant since Thursday, with associated nausea.  Patient endorses similar symptoms in the past for which diagnosed pancreatitis.  Patient denying fever, chills, changes in bowel habits.        Review of patient's allergies indicates:  No Known Allergies  Past Medical History:   Diagnosis Date    Alcoholic pancreatitis     Alcoholism     Disease of pancreas, unspecified     Marijuana abuse      Past Surgical History:   Procedure Laterality Date    CLOSED REDUCTION OF FRACTURE OF NASAL BONE Bilateral 2022    Procedure: CLOSED REDUCTION, FRACTURE, NASAL BONE;  Surgeon: Donn Schwartz MD;  Location: [facility] OR;  Service: ENT;  Laterality: Bilateral;    CLOSED REDUCTION OF FRACTURE OF NASAL BONE Bilateral 2023    Procedure: CLOSED REDUCTION, FRACTURE, NASAL BONE;  Surgeon: Donn Schwartz MD;  Location: [facility] OR;  Service: ENT;  Laterality: Bilateral;    HERNIA REPAIR       Family History   Problem Relation Name Age of Onset    Hypertension Mother      Pancreatic cancer Father      Alcohol abuse Father      Heart attack Brother      Pancreatic cancer Paternal Grandmother       Social History     Tobacco Use    Smoking status: Every Day     Current packs/day: 0.00     Average packs/day: 0.5 packs/day for 2.0 years (1.0 ttl pk-yrs)     Types: Cigarettes     Start date: 2020     Last attempt to quit: 2022     Years since quittin.9    Smokeless tobacco: Never   Substance Use Topics    Alcohol use: Yes     Alcohol/week: 2.0 standard drinks of alcohol     Types: 2 Cans of beer per week     Comment: on weekends    Drug use: Not Currently     Frequency: 3.0 times per week     Types: Marijuana     Comment: monthly     Review of Systems    Physical Exam "     Initial Vitals [05/11/24 2039]   BP Pulse Resp Temp SpO2   129/81 84 18 97.5 °F (36.4 °C) 99 %      MAP       --         Physical Exam    Nursing note and vitals reviewed.  Constitutional: He appears well-developed and well-nourished. He is not diaphoretic. No distress.   HENT:   Head: Normocephalic and atraumatic.   Eyes: EOM are normal. Pupils are equal, round, and reactive to light. Right eye exhibits no discharge. Left eye exhibits no discharge.   Neck: Neck supple. No thyromegaly present. No tracheal deviation present. No JVD present.   Normal range of motion.  Cardiovascular:  Normal rate, regular rhythm, normal heart sounds and intact distal pulses.           No murmur heard.  Pulmonary/Chest: Breath sounds normal. No stridor. No respiratory distress. He has no wheezes. He has no rhonchi. He has no rales.   Abdominal: Abdomen is soft. He exhibits no distension. There is no abdominal tenderness. There is no rebound and no guarding.   Musculoskeletal:         General: No tenderness or edema. Normal range of motion.      Cervical back: Normal range of motion and neck supple.     Neurological: He is alert and oriented to person, place, and time. He has normal strength. No cranial nerve deficit. GCS score is 15. GCS eye subscore is 4. GCS verbal subscore is 5. GCS motor subscore is 6.   Skin: Skin is warm and dry. Capillary refill takes less than 2 seconds. No rash and no abscess noted. No erythema. No pallor.   Psychiatric: He has a normal mood and affect. His behavior is normal. Judgment and thought content normal.         ED Course   Procedures  Labs Reviewed   COMPREHENSIVE METABOLIC PANEL - Abnormal; Notable for the following components:       Result Value    Chloride 108 (*)     CO2 21 (*)     Globulin 3.7 (*)      (*)     AST 63 (*)     All other components within normal limits   URINALYSIS, REFLEX TO URINE CULTURE - Abnormal; Notable for the following components:    Specific Gravity, UA 1.035  (*)     Protein, UA 1+ (*)     Ketones, UA Trace (*)     Urobilinogen, UA 1+ (*)     Bacteria, UA Trace (*)     Mucous, UA Many (*)     Hyaline Casts, UA 0-2 (*)     All other components within normal limits   DRUG SCREEN, URINE (BEAKER) - Abnormal; Notable for the following components:    Cannabinoids, Urine Positive (*)     All other components within normal limits    Narrative:     Cut off concentrations:    Amphetamines - 1000 ng/ml  Barbiturates - 200 ng/ml  Benzodiazepine - 200 ng/ml  Cannabinoids (THC) - 50 ng/ml  Cocaine - 300 ng/ml  Fentanyl - 1.0 ng/ml  MDMA - 500 ng/ml  Opiates - 300 ng/ml   Phencyclidine (PCP) - 25 ng/ml    Specimen submitted for drug analysis and tested for pH and specific gravity in order to evaluate sample integrity. Suspect tampering if specific gravity is <1.003 and/or pH is not within the range of 4.5 - 8.0  False negatives may result form substances such as bleach added to urine.  False positives may result for the presence of a substance with similar chemical structure to the drug or its metabolite.    This test provides only a PRELIMINARY analytical test result. A more specific alternate chemical method must be used in order to obtain a confirmed analytical result. Gas chromatography/mass spectrometry (GC/MS) is the preferred confirmatory method. Other chemical confirmation methods are available. Clinical consideration and professional judgement should be applied to any drug of abuse test result, particularly when preliminary positive results are used.    Positive results will be confirmed only at the physicians request. Unconfirmed screening results are to be used only for medical purposes (treatment).        CBC WITH DIFFERENTIAL - Abnormal; Notable for the following components:    RBC 3.79 (*)     Hgb 12.7 (*)     Hct 36.5 (*)     MCV 96.3 (*)     MCH 33.5 (*)     All other components within normal limits   MAGNESIUM - Normal   ALCOHOL,MEDICAL (ETHANOL) - Normal   LIPASE -  Normal   CBC W/ AUTO DIFFERENTIAL    Narrative:     The following orders were created for panel order CBC auto differential.  Procedure                               Abnormality         Status                     ---------                               -----------         ------                     CBC with Differential[[id number]]       Abnormal            Final result                 Please view results for these tests on the individual orders.   EXTRA TUBES    Narrative:     The following orders were created for panel order EXTRA TUBES.  Procedure                               Abnormality         Status                     ---------                               -----------         ------                     Light Blue Top Hold[[id number]]                             In process                 Lavender Top Hold[[id number]]                               In process                 Gold Top Hold[[id number]]                                   In process                   Please view results for these tests on the individual orders.   LIGHT BLUE TOP HOLD   LAVENDER TOP HOLD   GOLD TOP HOLD          Imaging Results              CT Abdomen Pelvis With IV Contrast NO Oral Contrast (Preliminary result)  Result time 05/11/24 22:37:54      Preliminary result by Eduardo Santos Jr., MD (05/11/24 22:37:54)                   Narrative:    START OF REPORT:  Technique: CT of the abdomen and pelvis was performed with axial images as well as sagittal and coronal reconstruction images with intravenous contrast.    Comparison: None available.    Clinical History: Abdominal Pain (States severe abdominal pain x 3 days. States had a bout of diarrhea Friday morning but none since.    Dosage Information: Automated Exposure Control was utilized.    Findings:  Lines and Tubes: None.  Thorax:  Lungs: The visualized lung bases appear unremarkable. No focal infiltrate or consolidation is seen.  Pleura: No effusions or thickening.  Heart:  The heart size is within normal limits.  Abdomen:  Abdominal Wall: No abdominal wall pathology is seen.  Liver: The liver appears unremarkable.  Biliary System: No intrahepatic or extrahepatic biliary duct dilatation is seen.  Gallbladder: Finding may be reactive with ongoing inflammatory process in the pancreas.  Pancreas: Edematous pancreatic head and uncinate process with surrounding peripancreatic fat stranding and fluid. This is consistent with acute pancreatitis.  Spleen: The spleen appears unremarkable.  Adrenals: The adrenal glands appear unremarkable.  Kidneys: The kidneys appear unremarkable with no stones cysts masses or hydronephrosis.  Aorta: The abdominal aorta appears unremarkable.  IVC: Unremarkable.  Bowel:  Esophagus: The visualized esophagus appears unremarkable.  Stomach: The wall of gastric fundus and antrum seem thick raising concern for gastritis.  Duodenum: Unremarkable appearing duodenum.  Small Bowel: The small bowel appears unremarkable.  Colon: Nondistended.  Appendix: The appendix appears unremarkable and is seen on Image 114, Series 2 through Image 103, Series 2.  Peritoneum: No free intraperitoneal air is seen.    Pelvis:  Bladder: The bladder is nondistended and cannot be definitively evaluated.  Male:  Prostate gland: The prostate gland appears unremarkable.  Inguinal Findings:  Inguinal Hernia: Incidental note is made of small uncomplicated mesenteric fat containing bilateral inguinal hernias.    Bony structures:  Dorsal Spine: The visualized dorsal spine appears unremarkable.  Bony Pelvis: The visualized bony structures of the pelvis appear unremarkable.      Impression:  1. Edematous pancreatic head and uncinate process with surrounding peripancreatic fat stranding and fluid. This is consistent with acute pancreatitis. Correlate clinically and with laboratory findings as regards additional evaluation and follow-up.  2. The wall of gastric fundus and antrum seem thick raising  concern for gastritis.  3. Details and other findings as discussed above.                                      X-Rays:   Independently Interpreted Readings:   Other Readings:  CT abdomen compatible with pancreatitis;    Medications   sodium chloride 0.9% bolus 1,000 mL 1,000 mL (0 mLs Intravenous Stopped 5/11/24 2232)   ketorolac injection 30 mg (30 mg Intravenous Given 5/11/24 2126)   droPERidol injection 0.625 mg (0.625 mg Intravenous Given 5/11/24 2126)   aluminum-magnesium hydroxide 200-200 mg/5 mL suspension 30 mL (30 mLs Oral Given 5/11/24 2126)   iohexoL (OMNIPAQUE 350) 350 mg iodine/mL injection (100 mLs Intravenous Given 5/11/24 2145)     Medical Decision Making  Patient is here with mid abdominal, he reports similar episodes in the past attributed to pancreatitis; differential diagnosis pancreatitis, biliary colic, cholecystitis, GERD, gastritis, others ...    Amount and/or Complexity of Data Reviewed  External Data Reviewed: notes.     Details: As above;  Labs: ordered. Decision-making details documented in ED Course.     Details: As above;  Radiology: ordered and independent interpretation performed. Decision-making details documented in ED Course.     Details: CT abdomen compatible with pancreatitis;    Risk  OTC drugs.  Prescription drug management.  Risk Details: Risk found sufficient to warrant expanded evaluation with objective data; patient is improved with conservative interventions he is discharged home with anticipatory guidance, return precautions, follow-up instructions.  Home in stable condition without event.               ED Course as of 05/11/24 2249   Sat May 11, 2024   2126 Negative ethanol level; [CT]   2127 Normal lipase; [CT]   2127 Reassuring chemistries; [CT]   2127 Reassuring hemogram; [CT]   2211 U tox positive for cannabinoids; [CT]   2211 Contaminated urinalysis, unconvincing as UTI; [CT]      ED Course User Index  [CT] TheRicardo schafer MD                            Clinical Impression:  Final diagnoses:  [K86.1] Chronic pancreatitis, unspecified pancreatitis type (Primary)  [R10.9] Abdominal pain, unspecified abdominal location          ED Disposition Condition    Discharge Stable          ED Prescriptions       Medication Sig Dispense Start Date End Date Auth. Provider    ondansetron (ZOFRAN-ODT) 4 MG TbDL Take 1 tablet (4 mg total) by mouth 2 (two) times daily. 12 tablet 5/11/2024 -- Ricardo Robert MD          Follow-up Information       Follow up With Specialties Details Why Contact Info    [facility] Emergency Dept Emergency Medicine  As needed, If symptoms worsen [address] [facility] [id number] [phone]    Lauren Bae, P Family Medicine Call   [address] [facility] [address]  [phone]               Ricardo Robert MD  05/11/24 0458

## 2024-10-13 ENCOUNTER — HOSPITAL ENCOUNTER (EMERGENCY)
Facility: HOSPITAL | Age: 49
Discharge: HOME | End: 2024-10-13
Attending: INTERNAL MEDICINE

## 2024-10-13 VITALS
WEIGHT: 159 LBS | BODY MASS INDEX: 24.1 KG/M2 | HEART RATE: 79 BPM | SYSTOLIC BLOOD PRESSURE: 163 MMHG | OXYGEN SATURATION: 100 % | HEIGHT: 68 IN | RESPIRATION RATE: 18 BRPM | TEMPERATURE: 98 F | DIASTOLIC BLOOD PRESSURE: 97 MMHG

## 2024-10-13 DIAGNOSIS — F10.10 ALCOHOL ABUSE: ICD-10-CM

## 2024-10-13 DIAGNOSIS — K85.20 ALCOHOL-INDUCED ACUTE PANCREATITIS, UNSPECIFIED COMPLICATION STATUS: Primary | ICD-10-CM

## 2024-10-13 DIAGNOSIS — I10 UNCONTROLLED HYPERTENSION: ICD-10-CM

## 2024-10-13 LAB
ALBUMIN SERPL-MCNC: 4.1 G/DL (ref 3.5–5)
ALBUMIN/GLOB SERPL: 0.8 RATIO (ref 1.1–2)
ALP SERPL-CCNC: 93 UNIT/L (ref 40–150)
ALT SERPL-CCNC: 64 UNIT/L (ref 0–55)
ANION GAP SERPL CALC-SCNC: 15 MEQ/L
AST SERPL-CCNC: 58 UNIT/L (ref 5–34)
BASOPHILS # BLD AUTO: 0.01 X10(3)/MCL
BASOPHILS NFR BLD AUTO: 0.1 %
BILIRUB SERPL-MCNC: 0.7 MG/DL
BUN SERPL-MCNC: 6.1 MG/DL (ref 8.9–20.6)
CALCIUM SERPL-MCNC: 10.5 MG/DL (ref 8.4–10.2)
CHLORIDE SERPL-SCNC: 101 MMOL/L (ref 98–107)
CO2 SERPL-SCNC: 24 MMOL/L (ref 22–29)
CREAT SERPL-MCNC: 0.87 MG/DL (ref 0.73–1.18)
CREAT/UREA NIT SERPL: 7
EOSINOPHIL # BLD AUTO: 0.02 X10(3)/MCL (ref 0–0.9)
EOSINOPHIL NFR BLD AUTO: 0.3 %
ERYTHROCYTE [DISTWIDTH] IN BLOOD BY AUTOMATED COUNT: 12.9 % (ref 11.5–17)
ETHANOL SERPL-MCNC: <10 MG/DL
GFR SERPLBLD CREATININE-BSD FMLA CKD-EPI: >60 ML/MIN/1.73/M2
GLOBULIN SER-MCNC: 4.9 GM/DL (ref 2.4–3.5)
GLUCOSE SERPL-MCNC: 94 MG/DL (ref 74–100)
HCT VFR BLD AUTO: 42.8 % (ref 42–52)
HGB BLD-MCNC: 14.9 G/DL (ref 14–18)
HOLD SPECIMEN: NORMAL
IMM GRANULOCYTES # BLD AUTO: 0.02 X10(3)/MCL (ref 0–0.04)
IMM GRANULOCYTES NFR BLD AUTO: 0.3 %
LDH SERPL-CCNC: 254 U/L (ref 125–220)
LIPASE SERPL-CCNC: 292 U/L
LYMPHOCYTES # BLD AUTO: 1.93 X10(3)/MCL (ref 0.6–4.6)
LYMPHOCYTES NFR BLD AUTO: 24.5 %
MCH RBC QN AUTO: 33.6 PG (ref 27–31)
MCHC RBC AUTO-ENTMCNC: 34.8 G/DL (ref 33–36)
MCV RBC AUTO: 96.4 FL (ref 80–94)
MONOCYTES # BLD AUTO: 0.76 X10(3)/MCL (ref 0.1–1.3)
MONOCYTES NFR BLD AUTO: 9.7 %
NEUTROPHILS # BLD AUTO: 5.13 X10(3)/MCL (ref 2.1–9.2)
NEUTROPHILS NFR BLD AUTO: 65.1 %
NRBC BLD AUTO-RTO: 0 %
PLATELET # BLD AUTO: 172 X10(3)/MCL (ref 130–400)
PMV BLD AUTO: 9.7 FL (ref 7.4–10.4)
POTASSIUM SERPL-SCNC: 4.1 MMOL/L (ref 3.5–5.1)
PROT SERPL-MCNC: 9 GM/DL (ref 6.4–8.3)
RBC # BLD AUTO: 4.44 X10(6)/MCL (ref 4.7–6.1)
SODIUM SERPL-SCNC: 140 MMOL/L (ref 136–145)
WBC # BLD AUTO: 7.87 X10(3)/MCL (ref 4.5–11.5)

## 2024-10-13 PROCEDURE — 83615 LACTATE (LD) (LDH) ENZYME: CPT

## 2024-10-13 PROCEDURE — 85025 COMPLETE CBC W/AUTO DIFF WBC: CPT

## 2024-10-13 PROCEDURE — 96372 THER/PROPH/DIAG INJ SC/IM: CPT

## 2024-10-13 PROCEDURE — 80053 COMPREHEN METABOLIC PANEL: CPT

## 2024-10-13 PROCEDURE — 83690 ASSAY OF LIPASE: CPT

## 2024-10-13 PROCEDURE — 99284 EMERGENCY DEPT VISIT MOD MDM: CPT | Mod: 25

## 2024-10-13 PROCEDURE — 63600175 PHARM REV CODE 636 W HCPCS

## 2024-10-13 PROCEDURE — 82077 ASSAY SPEC XCP UR&BREATH IA: CPT

## 2024-10-13 PROCEDURE — 25000003 PHARM REV CODE 250

## 2024-10-13 PROCEDURE — 25000003 PHARM REV CODE 250: Performed by: INTERNAL MEDICINE

## 2024-10-13 RX ORDER — CLONIDINE HYDROCHLORIDE 0.1 MG/1
0.1 TABLET ORAL
Status: COMPLETED | OUTPATIENT
Start: 2024-10-13 | End: 2024-10-13

## 2024-10-13 RX ORDER — HYDROCODONE BITARTRATE AND ACETAMINOPHEN 7.5; 325 MG/1; MG/1
1 TABLET ORAL ONCE
Status: COMPLETED | OUTPATIENT
Start: 2024-10-13 | End: 2024-10-13

## 2024-10-13 RX ORDER — FAMOTIDINE 20 MG/1
20 TABLET, FILM COATED ORAL 2 TIMES DAILY
Qty: 20 TABLET | Refills: 0 | Status: SHIPPED | OUTPATIENT
Start: 2024-10-13 | End: 2025-10-13

## 2024-10-13 RX ORDER — HYDRALAZINE HYDROCHLORIDE 20 MG/ML
10 INJECTION INTRAMUSCULAR; INTRAVENOUS
Status: COMPLETED | OUTPATIENT
Start: 2024-10-13 | End: 2024-10-13

## 2024-10-13 RX ORDER — ONDANSETRON 4 MG/1
4 TABLET, ORALLY DISINTEGRATING ORAL
Status: COMPLETED | OUTPATIENT
Start: 2024-10-13 | End: 2024-10-13

## 2024-10-13 RX ORDER — ONDANSETRON 4 MG/1
4 TABLET, ORALLY DISINTEGRATING ORAL 2 TIMES DAILY
Qty: 12 TABLET | Refills: 0 | OUTPATIENT
Start: 2024-10-13 | End: 2024-10-18

## 2024-10-13 RX ORDER — HYDROCODONE BITARTRATE AND ACETAMINOPHEN 7.5; 325 MG/1; MG/1
1 TABLET ORAL EVERY 6 HOURS PRN
Qty: 12 TABLET | Refills: 0 | OUTPATIENT
Start: 2024-10-13 | End: 2024-10-18

## 2024-10-13 RX ADMIN — HYDROCODONE BITARTRATE AND ACETAMINOPHEN 1 TABLET: 7.5; 325 TABLET ORAL at 01:10

## 2024-10-13 RX ADMIN — CLONIDINE HYDROCHLORIDE 0.1 MG: 0.1 TABLET ORAL at 01:10

## 2024-10-13 RX ADMIN — ONDANSETRON 4 MG: 4 TABLET, ORALLY DISINTEGRATING ORAL at 12:10

## 2024-10-13 RX ADMIN — HYDRALAZINE HYDROCHLORIDE 10 MG: 20 INJECTION INTRAMUSCULAR; INTRAVENOUS at 02:10

## 2024-10-13 NOTE — ED PROVIDER NOTES
Encounter Date: 10/13/2024       History     Chief Complaint   Patient presents with    Abdominal Pain    Nausea     In per EMS c/o abdominal pain and nausea x2 days, hx of pancreatitis     50 yo M with PMHx of previous alcoholic pancreatitis presents to the ED with complaints of abdominal pain. Patient states his pain is in the mid-epigastric to lower left abdomen that presented 2 days ago. He states the pain has been getting worse and radiates to the mid lower back. He has not been able to eat in the last 2 days because of the pain as well. He does state his last drink was 3 days ago and that it was 2 beers. He states he does drink occasional.      The history is provided by the patient.     Review of patient's allergies indicates:  No Known Allergies  Past Medical History:   Diagnosis Date    Alcoholic pancreatitis     Alcoholism     Disease of pancreas, unspecified     Marijuana abuse      Past Surgical History:   Procedure Laterality Date    CLOSED REDUCTION OF FRACTURE OF NASAL BONE Bilateral 2022    Procedure: CLOSED REDUCTION, FRACTURE, NASAL BONE;  Surgeon: Donn Schwartz MD;  Location: [facility] OR;  Service: ENT;  Laterality: Bilateral;    CLOSED REDUCTION OF FRACTURE OF NASAL BONE Bilateral 2023    Procedure: CLOSED REDUCTION, FRACTURE, NASAL BONE;  Surgeon: Donn Schwartz MD;  Location: [facility] OR;  Service: ENT;  Laterality: Bilateral;    HERNIA REPAIR       Family History   Problem Relation Name Age of Onset    Hypertension Mother      Pancreatic cancer Father      Alcohol abuse Father      Heart attack Brother      Pancreatic cancer Paternal Grandmother       Social History     Tobacco Use    Smoking status: Every Day     Current packs/day: 0.00     Average packs/day: 0.5 packs/day for 2.0 years (1.0 ttl pk-yrs)     Types: Cigarettes     Start date: 2020     Last attempt to quit: 2022     Years since quittin.3    Smokeless tobacco: Never   Substance Use Topics    Alcohol use: Yes      Alcohol/week: 2.0 standard drinks of alcohol     Types: 2 Cans of beer per week     Comment: on weekends    Drug use: Not Currently     Frequency: 3.0 times per week     Types: Marijuana     Comment: monthly     Review of Systems   Constitutional:  Negative for chills and fever.   Respiratory:  Negative for shortness of breath.    Cardiovascular:  Negative for chest pain.   Gastrointestinal:  Positive for abdominal pain, nausea and vomiting. Negative for abdominal distention, blood in stool and diarrhea.   Genitourinary:  Negative for dysuria and hematuria.   Musculoskeletal:  Positive for back pain.   Skin: Negative.        Physical Exam     Initial Vitals [10/13/24 1135]   BP Pulse Resp Temp SpO2   (!) 186/106 63 18 98.2 °F (36.8 °C) 100 %      MAP       --         Physical Exam    Nursing note and vitals reviewed.  Constitutional: He appears well-developed and well-nourished.   HENT:   Head: Normocephalic and atraumatic.   Eyes: Pupils are equal, round, and reactive to light.   Neck:   Normal range of motion.  Cardiovascular:  Normal rate, regular rhythm and normal heart sounds.           Pulmonary/Chest: Breath sounds normal.   Abdominal: Abdomen is soft. Bowel sounds are normal. There is abdominal tenderness (involuntary). There is guarding.   Musculoskeletal:         General: Normal range of motion.      Cervical back: Normal range of motion.     Neurological: He is alert and oriented to person, place, and time. GCS eye subscore is 4. GCS verbal subscore is 5. GCS motor subscore is 6.   Skin: Skin is warm and dry.   Psychiatric: Thought content normal.         ED Course   Procedures  Labs Reviewed   COMPREHENSIVE METABOLIC PANEL - Abnormal       Result Value    Sodium 140      Potassium 4.1      Chloride 101      CO2 24      Glucose 94      Blood Urea Nitrogen 6.1 (*)     Creatinine 0.87      Calcium 10.5 (*)     Protein Total 9.0 (*)     Albumin 4.1      Globulin 4.9 (*)     Albumin/Globulin Ratio 0.8 (*)      Bilirubin Total 0.7      ALP 93      ALT 64 (*)     AST 58 (*)     eGFR >60      Anion Gap 15.0      BUN/Creatinine Ratio 7     LIPASE - Abnormal    Lipase Level 292 (*)    CBC WITH DIFFERENTIAL - Abnormal    WBC 7.87      RBC 4.44 (*)     Hgb 14.9      Hct 42.8      MCV 96.4 (*)     MCH 33.6 (*)     MCHC 34.8      RDW 12.9      Platelet 172      MPV 9.7      Neut % 65.1      Lymph % 24.5      Mono % 9.7      Eos % 0.3      Basophil % 0.1      Lymph # 1.93      Neut # 5.13      Mono # 0.76      Eos # 0.02      Baso # 0.01      IG# 0.02      IG% 0.3      NRBC% 0.0     LACTATE DEHYDROGENASE - Abnormal    Lactate Dehydrogenase 254 (*)    ALCOHOL,MEDICAL (ETHANOL) - Normal    Ethanol Level <10.0     CBC W/ AUTO DIFFERENTIAL    Narrative:     The following orders were created for panel order CBC Auto Differential.  Procedure                               Abnormality         Status                     ---------                               -----------         ------                     CBC with Differential[[id number]]       Abnormal            Final result                 Please view results for these tests on the individual orders.   EXTRA TUBES    Narrative:     The following orders were created for panel order EXTRA TUBES.  Procedure                               Abnormality         Status                     ---------                               -----------         ------                     Light Blue Top Hold[[id number]]                             Final result               Gold Top Hold[[id number]]                                   Final result               Pink Top Hold[[id number]]                                   Final result                 Please view results for these tests on the individual orders.   LIGHT BLUE TOP HOLD    Extra Tube Hold for add-ons.     GOLD TOP HOLD    Extra Tube Hold for add-ons.     PINK TOP HOLD    Extra Tube Hold for add-ons.            Imaging Results    None           Medications   ondansetron disintegrating tablet 4 mg (4 mg Oral Given 10/13/24 1213)   HYDROcodone-acetaminophen 7.5-325 mg per tablet 1 tablet (1 tablet Oral Given 10/13/24 1302)   cloNIDine tablet 0.1 mg (0.1 mg Oral Given 10/13/24 1302)   hydrALAZINE injection 10 mg (10 mg Intramuscular Given 10/13/24 1448)     Medical Decision Making  Irvin's Criteria score was 0 with 1% mortality.      Amount and/or Complexity of Data Reviewed  Labs: ordered. Decision-making details documented in ED Course.     Details: 10/13/24 12:16  WBC: 7.87  RBC: 4.44 (L)  Hemoglobin: 14.9  Hematocrit: 42.8  MCV: 96.4 (H)  MCH: 33.6 (H)  MCHC: 34.8  RDW: 12.9  Platelet Count: 172  MPV: 9.7  Neut %: 65.1  LYMPH %: 24.5  Mono %: 9.7  Eos %: 0.3  Basophil %: 0.1  Immature Granulocytes: 0.3  Neut #: 5.13  Lymph #: 1.93  Mono #: 0.76  Eos #: 0.02  Baso #: 0.01  Immature Grans (Abs): 0.02  nRBC: 0.0  Sodium: 140  Potassium: 4.1  Chloride: 101  CO2: 24  Anion Gap: 15.0  BUN: 6.1 (L)  Creatinine: 0.87  BUN/CREAT RATIO: 7  eGFR: >60  Glucose: 94  Calcium: 10.5 (H)  ALP: 93  PROTEIN TOTAL: 9.0 (H)  Albumin: 4.1  Albumin/Globulin Ratio: 0.8 (L)  BILIRUBIN TOTAL: 0.7  AST: 58 (H)  ALT: 64 (H)  Lipase: 292 (H)  Globulin, Total: 4.9 (H)  Alcohol, Serum: <10.0    10/13/24 12:27  Lactate Dehydrogenase: 254 (H)      (L): Data is abnormally low  (H): Data is abnormally high    Risk  Prescription drug management.      Additional MDM:   Differential Diagnosis:   Other: The following diagnoses were also considered and will be evaluated: acute pancreatitis, uncontrolled hypertension and alcohol abuse.           Attending Attestation:   Physician Attestation Statement for Resident:  As the supervising MD   Physician Attestation Statement: I have personally seen and examined this patient.   I agree with the above history.  -:   As the supervising MD I agree with the above PE.      I have reviewed and agree with the residents interpretation of the following:  lab data.                                        Clinical Impression:  Final diagnoses:  [K85.20] Alcohol-induced acute pancreatitis, unspecified complication status (Primary)  [I10] Uncontrolled hypertension  [F10.10] Alcohol abuse          ED Disposition Condition    Discharge Stable            ED Prescriptions       Medication Sig Dispense Start Date End Date Auth. Provider    HYDROcodone-acetaminophen (NORCO) 7.5-325 mg per tablet Take 1 tablet by mouth every 6 (six) hours as needed for Pain. 12 tablet 10/13/2024 -- Gee Peacock DO    famotidine (PEPCID) 20 MG tablet Take 1 tablet (20 mg total) by mouth 2 (two) times daily. 20 tablet 10/13/2024 10/13/2025 Gee Peacock DO    ondansetron (ZOFRAN-ODT) 4 MG TbDL Take 1 tablet (4 mg total) by mouth 2 (two) times daily. 12 tablet 10/13/2024 -- Gee Peacock DO          Follow-up Information       Follow up With Specialties Details Why Contact Info Additional Information    [facility] Emergency Medicine Go to  If symptoms worsen [address] [place] [id number] [phone]     [facility] Internal Medicine Schedule an appointment as soon as possible for a visit in 2 weeks  [address] [place] [id number] [phone] Internal Medicine Clinic Entrance #1               Gee Peacock DO  Resident  10/13/24 1549       Igor Armendariz MD  10/13/24 1947

## 2024-10-13 NOTE — Clinical Note
"Herminio "Herminio" Ely was seen and treated in our emergency department on 10/13/2024.  He may return to work on 10/14/2024.       If you have any questions or concerns, please don't hesitate to call.      Gee Peacock, DO"

## 2024-10-18 ENCOUNTER — HOSPITAL ENCOUNTER (EMERGENCY)
Facility: HOSPITAL | Age: 49
Discharge: HOME | End: 2024-10-18
Attending: EMERGENCY MEDICINE

## 2024-10-18 VITALS
RESPIRATION RATE: 15 BRPM | DIASTOLIC BLOOD PRESSURE: 90 MMHG | OXYGEN SATURATION: 100 % | TEMPERATURE: 97 F | SYSTOLIC BLOOD PRESSURE: 169 MMHG | HEART RATE: 85 BPM

## 2024-10-18 DIAGNOSIS — K86.0 ALCOHOL-INDUCED CHRONIC PANCREATITIS: Primary | ICD-10-CM

## 2024-10-18 LAB
ALBUMIN SERPL-MCNC: 3.5 G/DL (ref 3.5–5)
ALBUMIN/GLOB SERPL: 0.8 RATIO (ref 1.1–2)
ALP SERPL-CCNC: 65 UNIT/L (ref 40–150)
ALT SERPL-CCNC: 21 UNIT/L (ref 0–55)
ANION GAP SERPL CALC-SCNC: 10 MEQ/L
AST SERPL-CCNC: 23 UNIT/L (ref 5–34)
BACTERIA #/AREA URNS AUTO: ABNORMAL /HPF
BASOPHILS # BLD AUTO: 0.03 X10(3)/MCL
BASOPHILS NFR BLD AUTO: 0.4 %
BILIRUB SERPL-MCNC: 0.4 MG/DL
BILIRUB UR QL STRIP.AUTO: NEGATIVE
BUN SERPL-MCNC: 19.6 MG/DL (ref 8.9–20.6)
CALCIUM SERPL-MCNC: 9.7 MG/DL (ref 8.4–10.2)
CHLORIDE SERPL-SCNC: 96 MMOL/L (ref 98–107)
CLARITY UR: CLEAR
CO2 SERPL-SCNC: 26 MMOL/L (ref 22–29)
COLOR UR AUTO: YELLOW
CREAT SERPL-MCNC: 0.99 MG/DL (ref 0.72–1.25)
CREAT/UREA NIT SERPL: 20
EOSINOPHIL # BLD AUTO: 0.08 X10(3)/MCL (ref 0–0.9)
EOSINOPHIL NFR BLD AUTO: 1.2 %
ERYTHROCYTE [DISTWIDTH] IN BLOOD BY AUTOMATED COUNT: 12.7 % (ref 11.5–17)
GFR SERPLBLD CREATININE-BSD FMLA CKD-EPI: >60 ML/MIN/1.73/M2
GLOBULIN SER-MCNC: 4.4 GM/DL (ref 2.4–3.5)
GLUCOSE SERPL-MCNC: 206 MG/DL (ref 74–100)
GLUCOSE UR QL STRIP: NORMAL
HCT VFR BLD AUTO: 40.4 % (ref 42–52)
HGB BLD-MCNC: 13.8 G/DL (ref 14–18)
HGB UR QL STRIP: NEGATIVE
HOLD SPECIMEN: NORMAL
HYALINE CASTS #/AREA URNS LPF: ABNORMAL /LPF
IMM GRANULOCYTES # BLD AUTO: 0.01 X10(3)/MCL (ref 0–0.04)
IMM GRANULOCYTES NFR BLD AUTO: 0.1 %
KETONES UR QL STRIP: ABNORMAL
LEUKOCYTE ESTERASE UR QL STRIP: NEGATIVE
LIPASE SERPL-CCNC: 239 U/L
LYMPHOCYTES # BLD AUTO: 1.99 X10(3)/MCL (ref 0.6–4.6)
LYMPHOCYTES NFR BLD AUTO: 29.5 %
MCH RBC QN AUTO: 32.9 PG (ref 27–31)
MCHC RBC AUTO-ENTMCNC: 34.2 G/DL (ref 33–36)
MCV RBC AUTO: 96.4 FL (ref 80–94)
MONOCYTES # BLD AUTO: 0.58 X10(3)/MCL (ref 0.1–1.3)
MONOCYTES NFR BLD AUTO: 8.6 %
MUCOUS THREADS URNS QL MICRO: ABNORMAL /LPF
NEUTROPHILS # BLD AUTO: 4.06 X10(3)/MCL (ref 2.1–9.2)
NEUTROPHILS NFR BLD AUTO: 60.2 %
NITRITE UR QL STRIP: NEGATIVE
NRBC BLD AUTO-RTO: 0 %
PH UR STRIP: 5.5 [PH]
PLATELET # BLD AUTO: 230 X10(3)/MCL (ref 130–400)
PMV BLD AUTO: 10 FL (ref 7.4–10.4)
POTASSIUM SERPL-SCNC: 3.5 MMOL/L (ref 3.5–5.1)
PROT SERPL-MCNC: 7.9 GM/DL (ref 6.4–8.3)
PROT UR QL STRIP: ABNORMAL
RBC # BLD AUTO: 4.19 X10(6)/MCL (ref 4.7–6.1)
RBC #/AREA URNS AUTO: ABNORMAL /HPF
SODIUM SERPL-SCNC: 132 MMOL/L (ref 136–145)
SP GR UR STRIP.AUTO: 1.03 (ref 1–1.03)
SQUAMOUS #/AREA URNS LPF: ABNORMAL /HPF
UROBILINOGEN UR STRIP-ACNC: NORMAL
WBC # BLD AUTO: 6.75 X10(3)/MCL (ref 4.5–11.5)
WBC #/AREA URNS AUTO: ABNORMAL /HPF

## 2024-10-18 PROCEDURE — 99284 EMERGENCY DEPT VISIT MOD MDM: CPT | Mod: 25

## 2024-10-18 PROCEDURE — 96374 THER/PROPH/DIAG INJ IV PUSH: CPT

## 2024-10-18 PROCEDURE — 81001 URINALYSIS AUTO W/SCOPE: CPT | Performed by: PHYSICIAN ASSISTANT

## 2024-10-18 PROCEDURE — 85025 COMPLETE CBC W/AUTO DIFF WBC: CPT | Performed by: PHYSICIAN ASSISTANT

## 2024-10-18 PROCEDURE — 83690 ASSAY OF LIPASE: CPT | Performed by: PHYSICIAN ASSISTANT

## 2024-10-18 PROCEDURE — 63600175 PHARM REV CODE 636 W HCPCS: Performed by: PHYSICIAN ASSISTANT

## 2024-10-18 PROCEDURE — 80053 COMPREHEN METABOLIC PANEL: CPT | Performed by: PHYSICIAN ASSISTANT

## 2024-10-18 RX ORDER — MORPHINE SULFATE 2 MG/ML
4 INJECTION, SOLUTION INTRAMUSCULAR; INTRAVENOUS
Status: COMPLETED | OUTPATIENT
Start: 2024-10-18 | End: 2024-10-18

## 2024-10-18 RX ORDER — HYDROCODONE BITARTRATE AND ACETAMINOPHEN 5; 325 MG/1; MG/1
1 TABLET ORAL EVERY 6 HOURS PRN
Qty: 8 TABLET | Refills: 0 | Status: SHIPPED | OUTPATIENT
Start: 2024-10-18

## 2024-10-18 RX ORDER — ONDANSETRON 4 MG/1
4 TABLET, ORALLY DISINTEGRATING ORAL EVERY 8 HOURS PRN
Qty: 12 TABLET | Refills: 0 | Status: SHIPPED | OUTPATIENT
Start: 2024-10-18

## 2024-10-18 RX ADMIN — MORPHINE SULFATE 4 MG: 2 INJECTION, SOLUTION INTRAMUSCULAR; INTRAVENOUS at 04:10

## 2024-10-18 NOTE — ED PROVIDER NOTES
Encounter Date: 10/18/2024       History     Chief Complaint   Patient presents with    Abdominal Pain     PT IN /AASI W CO ABD PAIN W NAUSEA SINCE 1 AM. DENIES ETOH USES X 7 DAYS.  RECEIVED 4MG ZOFRAN AND 100MG FENTANYL IV/EMS.  20G TO LT AC.        Patient with pmhx of alcoholic pancreatitis presents today via EMS c/o lower abdominal pain that radiates into upper back. Associated symptoms include nausea. Patient says he hasn't had a drink in 8 days. He reports improvement in symptoms with zofran and fentanyl given by EMS.     The history is provided by the patient. No  was used.     Review of patient's allergies indicates:  No Known Allergies  Past Medical History:   Diagnosis Date    Alcoholic pancreatitis     Alcoholism     Disease of pancreas, unspecified     Marijuana abuse      Past Surgical History:   Procedure Laterality Date    CLOSED REDUCTION OF FRACTURE OF NASAL BONE Bilateral 2022    Procedure: CLOSED REDUCTION, FRACTURE, NASAL BONE;  Surgeon: Donn Schwartz MD;  Location: [facility];  Service: ENT;  Laterality: Bilateral;    CLOSED REDUCTION OF FRACTURE OF NASAL BONE Bilateral 2023    Procedure: CLOSED REDUCTION, FRACTURE, NASAL BONE;  Surgeon: Donn Schwartz MD;  Location: [facility] OR;  Service: ENT;  Laterality: Bilateral;    HERNIA REPAIR       Family History   Problem Relation Name Age of Onset    Hypertension Mother      Pancreatic cancer Father      Alcohol abuse Father      Heart attack Brother      Pancreatic cancer Paternal Grandmother       Social History     Tobacco Use    Smoking status: Every Day     Current packs/day: 0.00     Average packs/day: 0.5 packs/day for 2.0 years (1.0 ttl pk-yrs)     Types: Cigarettes     Start date: 2020     Last attempt to quit: 2022     Years since quittin.3    Smokeless tobacco: Never   Substance Use Topics    Alcohol use: Yes     Alcohol/week: 2.0 standard drinks of alcohol     Types: 2 Cans of beer per week      Comment: on weekends    Drug use: Not Currently     Frequency: 3.0 times per week     Types: Marijuana     Comment: monthly     Review of Systems   Constitutional:  Negative for chills and fever.   Respiratory:  Negative for cough and shortness of breath.    Cardiovascular:  Negative for chest pain.   Gastrointestinal:  Positive for abdominal pain and nausea. Negative for constipation, diarrhea and vomiting.   Genitourinary:  Negative for dysuria, flank pain and hematuria.   Musculoskeletal:  Positive for arthralgias. Negative for myalgias.   Skin:  Negative for rash.   Neurological:  Negative for syncope, light-headedness and headaches.   All other systems reviewed and are negative.      Physical Exam     Initial Vitals [10/18/24 1509]   BP Pulse Resp Temp SpO2   (!) 125/91 86 20 97.2 °F (36.2 °C) 97 %      MAP       --         Physical Exam    Vitals reviewed.  Constitutional: He is not diaphoretic.   HENT:   Head: Normocephalic and atraumatic. Mouth/Throat: Oropharynx is clear and moist. No oropharyngeal exudate.   Eyes: Conjunctivae and EOM are normal.   Neck: Neck supple.   Cardiovascular:  Normal rate, regular rhythm, normal heart sounds and intact distal pulses.           Pulmonary/Chest: Breath sounds normal. No respiratory distress. He has no wheezes. He has no rhonchi. He has no rales.   Abdominal: Abdomen is soft and flat. Bowel sounds are normal. He exhibits no distension. There is no abdominal tenderness.   No right CVA tenderness.  No left CVA tenderness. There is no rebound and no guarding.   Musculoskeletal:         General: No edema.      Cervical back: Neck supple.     Neurological: He is alert and oriented to person, place, and time. GCS score is 15. GCS eye subscore is 4. GCS verbal subscore is 5. GCS motor subscore is 6.   Skin: Skin is warm and dry. Capillary refill takes less than 2 seconds. No rash noted.   Psychiatric: He has a normal mood and affect.         ED Course   Procedures  Labs  Reviewed   COMPREHENSIVE METABOLIC PANEL - Abnormal       Result Value    Sodium 132 (*)     Potassium 3.5      Chloride 96 (*)     CO2 26      Glucose 206 (*)     Blood Urea Nitrogen 19.6      Creatinine 0.99      Calcium 9.7      Protein Total 7.9      Albumin 3.5      Globulin 4.4 (*)     Albumin/Globulin Ratio 0.8 (*)     Bilirubin Total 0.4      ALP 65      ALT 21      AST 23      eGFR >60      Anion Gap 10.0      BUN/Creatinine Ratio 20     LIPASE - Abnormal    Lipase Level 239 (*)    URINALYSIS, REFLEX TO URINE CULTURE - Abnormal    Color, UA Yellow      Appearance, UA Clear      Specific Gravity, UA 1.026      pH, UA 5.5      Protein, UA 1+ (*)     Glucose, UA Normal      Ketones, UA 1+ (*)     Blood, UA Negative      Bilirubin, UA Negative      Urobilinogen, UA Normal      Nitrites, UA Negative      Leukocyte Esterase, UA Negative      RBC, UA 0-5      WBC, UA 0-5      Bacteria, UA None Seen      Squamous Epithelial Cells, UA None Seen      Mucous, UA Trace (*)     Hyaline Casts, UA None Seen     CBC WITH DIFFERENTIAL - Abnormal    WBC 6.75      RBC 4.19 (*)     Hgb 13.8 (*)     Hct 40.4 (*)     MCV 96.4 (*)     MCH 32.9 (*)     MCHC 34.2      RDW 12.7      Platelet 230      MPV 10.0      Neut % 60.2      Lymph % 29.5      Mono % 8.6      Eos % 1.2      Basophil % 0.4      Lymph # 1.99      Neut # 4.06      Mono # 0.58      Eos # 0.08      Baso # 0.03      IG# 0.01      IG% 0.1      NRBC% 0.0     CBC W/ AUTO DIFFERENTIAL    Narrative:     The following orders were created for panel order CBC Auto Differential.  Procedure                               Abnormality         Status                     ---------                               -----------         ------                     CBC with Differential[[id number]]       Abnormal            Final result                 Please view results for these tests on the individual orders.   EXTRA TUBES    Narrative:     The following orders were created for panel  order EXTRA TUBES.  Procedure                               Abnormality         Status                     ---------                               -----------         ------                     Light Blue Top Hold[[id number]]                             Final result               Light Green Top Hold[[id number]]                            Final result               Lavender Top Hold[[id number]]                               Final result               Gold Top Hold[[id number]]                                   Final result                 Please view results for these tests on the individual orders.   LIGHT BLUE TOP HOLD    Extra Tube Hold for add-ons.     LIGHT GREEN TOP HOLD    Extra Tube Hold for add-ons.     LAVENDER TOP HOLD    Extra Tube Hold for add-ons.     GOLD TOP HOLD    Extra Tube Hold for add-ons.            Imaging Results    None          Medications   morphine injection 4 mg (4 mg Intravenous Given 10/18/24 1642)     Medical Decision Making  Ddx: acute pancreatitis, chronic pancreatitis, gastritis, gastroparesis, constipation, IBS, amongst others    Amount and/or Complexity of Data Reviewed  Labs: ordered. Decision-making details documented in ED Course.    Risk  Prescription drug management.               ED Course as of 10/18/24 1819   Fri Oct 18, 2024   1810 SpO2: 100 %  Patient re-evaluated at this time. Reports resolution in pain. He is tolerating PO. Discussed doing CT scan. Patient declined and would prefer to go home. Recommend cessation of alcohol. Advised to f/u with pcp. Stable for discharge. ED precautions given.  [SA]   1817 WBC: 6.75 [SA]   1817 Hemoglobin(!): 13.8 [SA]   1817 Hematocrit(!): 40.4 [SA]   1817 Platelet Count: 230 [SA]   1817 BUN: 19.6 [SA]   1817 Creatinine: 0.99 [SA]   1817 Glucose(!): 206 [SA]   1817 Lipase(!): 239 [SA]   1817 NITRITE UA: Negative [SA]   1817 Leukocyte Esterase, UA: Negative [SA]   1817 RBC, UA: 0-5 [SA]   1817 WBC, UA: 0-5 [SA]   1817 BP(!): 153/99 [SA]    1817 Pulse: 80 [SA]   1817 Resp: 15 [SA]      ED Course User Index  [SA] Rae Howard PA                           Clinical Impression:  Final diagnoses:  [K86.0] Alcohol-induced chronic pancreatitis (Primary)          ED Disposition Condition    Discharge Stable          ED Prescriptions       Medication Sig Dispense Start Date End Date Auth. Provider    HYDROcodone-acetaminophen (NORCO) 5-325 mg per tablet Take 1 tablet by mouth every 6 (six) hours as needed for Pain. 8 tablet 10/18/2024 -- Rae Howard PA    ondansetron (ZOFRAN-ODT) 4 MG TbDL Take 1 tablet (4 mg total) by mouth every 8 (eight) hours as needed (nausea/vomiting). 12 tablet 10/18/2024 -- Rae Howard PA          Follow-up Information       Follow up With Specialties Details Why Contact Info    [facility] Emergency Medicine  If symptoms worsen return to ED immediately [address] [facility] [address] [phone]    Primary Care Provider  Go in 2 days               Rae Howard PA  10/18/24 1819

## 2024-10-18 NOTE — Clinical Note
"Herminio Weston" Ely was seen and treated in our emergency department on 10/18/2024.  He may return to work on 10/21/2024.       If you have any questions or concerns, please don't hesitate to call.      valeria LYLES    "

## 2025-07-14 ENCOUNTER — HOSPITAL ENCOUNTER (EMERGENCY)
Facility: HOSPITAL | Age: 50
Discharge: LEFT BEFORE DISCHARGE | End: 2025-07-14
Attending: FAMILY MEDICINE
Payer: COMMERCIAL

## 2025-07-14 VITALS
TEMPERATURE: 98 F | SYSTOLIC BLOOD PRESSURE: 115 MMHG | BODY MASS INDEX: 25.53 KG/M2 | WEIGHT: 162.63 LBS | HEART RATE: 78 BPM | DIASTOLIC BLOOD PRESSURE: 77 MMHG | RESPIRATION RATE: 20 BRPM | OXYGEN SATURATION: 98 % | HEIGHT: 67 IN

## 2025-07-14 DIAGNOSIS — Z53.21 ELOPED FROM EMERGENCY DEPARTMENT: Primary | ICD-10-CM

## 2025-07-14 DIAGNOSIS — R10.9 RIGHT LATERAL ABDOMINAL PAIN: ICD-10-CM

## 2025-07-14 DIAGNOSIS — B35.3 TINEA PEDIS OF RIGHT FOOT: ICD-10-CM

## 2025-07-14 LAB
ALBUMIN SERPL-MCNC: 3.7 G/DL (ref 3.5–5)
ALBUMIN/GLOB SERPL: 1 RATIO (ref 1.1–2)
ALP SERPL-CCNC: 78 UNIT/L (ref 40–150)
ALT SERPL-CCNC: 31 UNIT/L (ref 0–55)
ANION GAP SERPL CALC-SCNC: 8 MEQ/L
AST SERPL-CCNC: 51 UNIT/L (ref 11–45)
BASOPHILS # BLD AUTO: 0.02 X10(3)/MCL
BASOPHILS NFR BLD AUTO: 0.3 %
BILIRUB SERPL-MCNC: 0.4 MG/DL
BUN SERPL-MCNC: 11.6 MG/DL (ref 8.9–20.6)
CALCIUM SERPL-MCNC: 8.3 MG/DL (ref 8.4–10.2)
CHLORIDE SERPL-SCNC: 104 MMOL/L (ref 98–107)
CO2 SERPL-SCNC: 22 MMOL/L (ref 22–29)
CREAT SERPL-MCNC: 0.97 MG/DL (ref 0.72–1.25)
CREAT/UREA NIT SERPL: 12
EOSINOPHIL # BLD AUTO: 0.09 X10(3)/MCL (ref 0–0.9)
EOSINOPHIL NFR BLD AUTO: 1.5 %
ERYTHROCYTE [DISTWIDTH] IN BLOOD BY AUTOMATED COUNT: 13 % (ref 11.5–17)
GFR SERPLBLD CREATININE-BSD FMLA CKD-EPI: >60 ML/MIN/1.73/M2
GLOBULIN SER-MCNC: 3.8 GM/DL (ref 2.4–3.5)
GLUCOSE SERPL-MCNC: 85 MG/DL (ref 74–100)
HCT VFR BLD AUTO: 37.3 % (ref 42–52)
HGB BLD-MCNC: 12.6 G/DL (ref 14–18)
HOLD SPECIMEN: NORMAL
HOLD SPECIMEN: NORMAL
IMM GRANULOCYTES # BLD AUTO: 0.01 X10(3)/MCL (ref 0–0.04)
IMM GRANULOCYTES NFR BLD AUTO: 0.2 %
LIPASE SERPL-CCNC: 21 U/L
LYMPHOCYTES # BLD AUTO: 2.7 X10(3)/MCL (ref 0.6–4.6)
LYMPHOCYTES NFR BLD AUTO: 45 %
MCH RBC QN AUTO: 32.9 PG (ref 27–31)
MCHC RBC AUTO-ENTMCNC: 33.8 G/DL (ref 33–36)
MCV RBC AUTO: 97.4 FL (ref 80–94)
MONOCYTES # BLD AUTO: 0.48 X10(3)/MCL (ref 0.1–1.3)
MONOCYTES NFR BLD AUTO: 8 %
NEUTROPHILS # BLD AUTO: 2.7 X10(3)/MCL (ref 2.1–9.2)
NEUTROPHILS NFR BLD AUTO: 45 %
NRBC BLD AUTO-RTO: 0 %
PLATELET # BLD AUTO: 141 X10(3)/MCL (ref 130–400)
PLATELETS.RETICULATED NFR BLD AUTO: 5.8 % (ref 0.9–11.2)
PMV BLD AUTO: 10.7 FL (ref 7.4–10.4)
POTASSIUM SERPL-SCNC: 3.9 MMOL/L (ref 3.5–5.1)
PROT SERPL-MCNC: 7.5 GM/DL (ref 6.4–8.3)
RBC # BLD AUTO: 3.83 X10(6)/MCL (ref 4.7–6.1)
SODIUM SERPL-SCNC: 134 MMOL/L (ref 136–145)
WBC # BLD AUTO: 6 X10(3)/MCL (ref 4.5–11.5)

## 2025-07-14 PROCEDURE — 80053 COMPREHEN METABOLIC PANEL: CPT | Performed by: NURSE PRACTITIONER

## 2025-07-14 PROCEDURE — 83690 ASSAY OF LIPASE: CPT | Performed by: NURSE PRACTITIONER

## 2025-07-14 PROCEDURE — 99283 EMERGENCY DEPT VISIT LOW MDM: CPT

## 2025-07-14 PROCEDURE — 85025 COMPLETE CBC W/AUTO DIFF WBC: CPT | Performed by: NURSE PRACTITIONER

## 2025-07-14 NOTE — ED PROVIDER NOTES
"Encounter Date: 7/14/2025       History     Chief Complaint   Patient presents with    Abdominal Pain     Sudden right sided abd pain that started about 45 minutes ago. Also right foot pain with drainage from the skin x1 year. "I work a lot" States feet sweat often at work. No abd pain currently.      Patient is a 50-year-old male who presents for evaluation after experiencing right lower abdominal pain which began approximately one hour prior to arrival.  Denies nausea, vomiting, or change in bowel or bladder function.  Chronic medical conditions include pancreatitis and substance abuse.  The patient also is complaining of changes in the skin of his right foot.  Reports multiple "sores" to area and is uncertain of the total time that it has been there.  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  Denies trauma to feet, chest pain, shortness of breath, fever, or loss of sensation to extremity.      Review of patient's allergies indicates:  No Known Allergies  Past Medical History:   Diagnosis Date    Alcoholic pancreatitis     Alcoholism     Disease of pancreas, unspecified     Marijuana abuse      Past Surgical History:   Procedure Laterality Date    CLOSED REDUCTION OF FRACTURE OF NASAL BONE Bilateral 6/27/2022    Procedure: CLOSED REDUCTION, FRACTURE, NASAL BONE;  Surgeon: Donn Schwartz MD;  Location: [facility];  Service: ENT;  Laterality: Bilateral;    CLOSED REDUCTION OF FRACTURE OF NASAL BONE Bilateral 2/24/2023    Procedure: CLOSED REDUCTION, FRACTURE, NASAL BONE;  Surgeon: Donn Schwartz MD;  Location: [facility];  Service: ENT;  Laterality: Bilateral;    HERNIA REPAIR       Family History   Problem Relation Name Age of Onset    Hypertension Mother      Pancreatic cancer Father      Alcohol abuse Father      Heart attack Brother      Pancreatic cancer Paternal Grandmother       Social History[1]  Review of Systems   Constitutional:  Negative for chills, diaphoresis, fatigue and " fever.   HENT:  Negative for facial swelling, postnasal drip, rhinorrhea, sinus pressure, sinus pain, sore throat and trouble swallowing.    Respiratory:  Negative for cough, chest tightness, shortness of breath and wheezing.    Cardiovascular:  Negative for chest pain, palpitations and leg swelling.   Gastrointestinal:  Positive for abdominal pain. Negative for diarrhea, nausea and vomiting.   Genitourinary:  Negative for dysuria, flank pain, hematuria and urgency.   Musculoskeletal:  Negative for arthralgias, back pain and myalgias.   Skin:  Positive for wound. Negative for color change and rash.   Neurological:  Negative for dizziness, syncope, weakness and headaches.   Hematological:  Does not bruise/bleed easily.   All other systems reviewed and are negative.      Physical Exam     Initial Vitals [07/14/25 1514]   BP Pulse Resp Temp SpO2   115/77 78 20 97.9 °F (36.6 °C) 98 %      MAP       --         Physical Exam    Nursing note and vitals reviewed.  Constitutional: Vital signs are normal. He appears well-developed and well-nourished.   HENT:   Head: Normocephalic.   Nose: Nose normal. Mouth/Throat: Oropharynx is clear and moist.   Eyes: Conjunctivae and EOM are normal. Pupils are equal, round, and reactive to light.   Neck: Neck supple.   Normal range of motion.  Cardiovascular:  Normal rate, regular rhythm, normal heart sounds and intact distal pulses.           Pulmonary/Chest: Effort normal and breath sounds normal. No respiratory distress. He has no wheezes. He has no rhonchi. He has no rales. He exhibits no tenderness.   Abdominal: Abdomen is soft and flat. Bowel sounds are normal. There is no abdominal tenderness. There is no rebound, no guarding, no tenderness at McBurney's point and negative De La Rosa's sign.   Musculoskeletal:         General: Normal range of motion.      Cervical back: Normal range of motion and neck supple.        Feet:      Neurological: He is alert and oriented to person, place, and  time. He has normal strength.   Skin: Skin is warm and dry. Capillary refill takes less than 2 seconds.   Psychiatric: He has a normal mood and affect. His behavior is normal. Judgment and thought content normal.         ED Course   Procedures  Labs Reviewed   COMPREHENSIVE METABOLIC PANEL - Abnormal       Result Value    Sodium 134 (*)     Potassium 3.9      Chloride 104      CO2 22      Glucose 85      Blood Urea Nitrogen 11.6      Creatinine 0.97      Calcium 8.3 (*)     Protein Total 7.5      Albumin 3.7      Globulin 3.8 (*)     Albumin/Globulin Ratio 1.0 (*)     Bilirubin Total 0.4      ALP 78      ALT 31      AST 51 (*)     eGFR >60      Anion Gap 8.0      BUN/Creatinine Ratio 12     CBC WITH DIFFERENTIAL - Abnormal    WBC 6.00      RBC 3.83 (*)     Hgb 12.6 (*)     Hct 37.3 (*)     MCV 97.4 (*)     MCH 32.9 (*)     MCHC 33.8      RDW 13.0      Platelet 141      MPV 10.7 (*)     IPF 5.8      Neut % 45.0      Lymph % 45.0      Mono % 8.0      Eos % 1.5      Basophil % 0.3      Imm Grans % 0.2      Neut # 2.70      Lymph # 2.70      Mono # 0.48      Eos # 0.09      Baso # 0.02      Imm Gran # 0.01      NRBC% 0.0     LIPASE - Normal    Lipase Level 21     CBC W/ AUTO DIFFERENTIAL    Narrative:     The following orders were created for panel order CBC auto differential.  Procedure                               Abnormality         Status                     ---------                               -----------         ------                     CBC with Differential[[id number]]       Abnormal            Final result                 Please view results for these tests on the individual orders.   EXTRA TUBES    Narrative:     The following orders were created for panel order EXTRA TUBES.  Procedure                               Abnormality         Status                     ---------                               -----------         ------                     Light Blue Top Hold[[id number]]                              Final result                 Please view results for these tests on the individual orders.   LIGHT BLUE TOP HOLD    Extra Tube Hold for add-ons.     URINALYSIS, REFLEX TO URINE CULTURE   EXTRA TUBES    Narrative:     The following orders were created for panel order EXTRA TUBES.  Procedure                               Abnormality         Status                     ---------                               -----------         ------                     Lavender Top Hold[[id number]]                               In process                   Please view results for these tests on the individual orders.   LAVENDER TOP HOLD          Imaging Results    None          Medications - No data to display  Medical Decision Making  Differential:   Tinea pedis   Abdominal pain   Constipation   Cystitis    Amount and/or Complexity of Data Reviewed  Labs: ordered.               ED Course as of 07/14/25 1721   Mon Jul 14, 2025 1720 I have been notified by ED nursing staff that patient has eloped from the department.  They report seeing him ambulate towards the parking lot without visible distress. [JA]      ED Course User Index  [JA] Lenny Mccartney Jr., DNP                           This chart is generated using a voice recognition system. Grammatical and content areas may inadvertently be generated in error. Please contact me if you find a perceive any inappropriate information in this chart. Thank you.       Clinical Impression:  Final diagnoses:  [B35.3] Tinea pedis of right foot  [R10.9] Right lateral abdominal pain  [Z53.21] Eloped from emergency department (Primary)          ED Disposition Condition    Eloped                       [1]   Social History  Tobacco Use    Smoking status: Every Day     Current packs/day: 0.00     Average packs/day: 0.5 packs/day for 2.0 years (1.0 ttl pk-yrs)     Types: Cigarettes     Start date: 6/8/2020     Last attempt to quit: 6/8/2022     Years since quitting: 3.1    Smokeless tobacco: Never    Substance Use Topics    Alcohol use: Yes     Alcohol/week: 2.0 standard drinks of alcohol     Types: 2 Cans of beer per week     Comment: on weekends    Drug use: Not Currently     Frequency: 3.0 times per week     Types: Marijuana     Comment: monthly        Lenny Mccartney Jr., [facility]  07/14/25 1353

## 2025-08-27 ENCOUNTER — HOSPITAL ENCOUNTER (EMERGENCY)
Facility: HOSPITAL | Age: 50
Discharge: HOME | End: 2025-08-27
Attending: EMERGENCY MEDICINE
Payer: COMMERCIAL

## 2025-08-27 VITALS
BODY MASS INDEX: 28.34 KG/M2 | RESPIRATION RATE: 19 BRPM | WEIGHT: 176.38 LBS | SYSTOLIC BLOOD PRESSURE: 182 MMHG | TEMPERATURE: 98 F | DIASTOLIC BLOOD PRESSURE: 103 MMHG | HEIGHT: 66 IN | OXYGEN SATURATION: 99 % | HEART RATE: 72 BPM

## 2025-08-27 DIAGNOSIS — K86.3 PANCREATIC PSEUDOCYST: Primary | ICD-10-CM

## 2025-08-27 DIAGNOSIS — R10.11 RUQ PAIN: ICD-10-CM

## 2025-08-27 LAB
ALBUMIN SERPL-MCNC: 3.9 G/DL (ref 3.5–5)
ALBUMIN/GLOB SERPL: 0.8 RATIO (ref 1.1–2)
ALP SERPL-CCNC: 74 UNIT/L (ref 40–150)
ALT SERPL-CCNC: 24 UNIT/L (ref 0–55)
ANION GAP SERPL CALC-SCNC: 13 MEQ/L
AST SERPL-CCNC: 38 UNIT/L (ref 11–45)
BASOPHILS # BLD AUTO: 0.04 X10(3)/MCL
BASOPHILS NFR BLD AUTO: 0.5 %
BILIRUB SERPL-MCNC: 0.5 MG/DL
BUN SERPL-MCNC: 9.1 MG/DL (ref 8.9–20.6)
CALCIUM SERPL-MCNC: 9.6 MG/DL (ref 8.4–10.2)
CHLORIDE SERPL-SCNC: 103 MMOL/L (ref 98–107)
CO2 SERPL-SCNC: 22 MMOL/L (ref 22–29)
CREAT SERPL-MCNC: 0.8 MG/DL (ref 0.72–1.25)
CREAT/UREA NIT SERPL: 11
EOSINOPHIL # BLD AUTO: 0.06 X10(3)/MCL (ref 0–0.9)
EOSINOPHIL NFR BLD AUTO: 0.8 %
ERYTHROCYTE [DISTWIDTH] IN BLOOD BY AUTOMATED COUNT: 13 % (ref 11.5–17)
GFR SERPLBLD CREATININE-BSD FMLA CKD-EPI: >60 ML/MIN/1.73/M2
GLOBULIN SER-MCNC: 4.6 GM/DL (ref 2.4–3.5)
GLUCOSE SERPL-MCNC: 73 MG/DL (ref 74–100)
HCT VFR BLD AUTO: 39.2 % (ref 42–52)
HGB BLD-MCNC: 13.7 G/DL (ref 14–18)
HOLD SPECIMEN: NORMAL
IMM GRANULOCYTES # BLD AUTO: 0.02 X10(3)/MCL (ref 0–0.04)
IMM GRANULOCYTES NFR BLD AUTO: 0.3 %
LIPASE SERPL-CCNC: 79 U/L
LYMPHOCYTES # BLD AUTO: 3.13 X10(3)/MCL (ref 0.6–4.6)
LYMPHOCYTES NFR BLD AUTO: 39.9 %
MAGNESIUM SERPL-MCNC: 2.2 MG/DL (ref 1.6–2.6)
MCH RBC QN AUTO: 33 PG (ref 27–31)
MCHC RBC AUTO-ENTMCNC: 34.9 G/DL (ref 33–36)
MCV RBC AUTO: 94.5 FL (ref 80–94)
MONOCYTES # BLD AUTO: 0.6 X10(3)/MCL (ref 0.1–1.3)
MONOCYTES NFR BLD AUTO: 7.6 %
NEUTROPHILS # BLD AUTO: 4 X10(3)/MCL (ref 2.1–9.2)
NEUTROPHILS NFR BLD AUTO: 50.9 %
NRBC BLD AUTO-RTO: 0 %
PLATELET # BLD AUTO: 196 X10(3)/MCL (ref 130–400)
PMV BLD AUTO: 10.7 FL (ref 7.4–10.4)
POTASSIUM SERPL-SCNC: 4.5 MMOL/L (ref 3.5–5.1)
PROT SERPL-MCNC: 8.5 GM/DL (ref 6.4–8.3)
RBC # BLD AUTO: 4.15 X10(6)/MCL (ref 4.7–6.1)
SODIUM SERPL-SCNC: 138 MMOL/L (ref 136–145)
TROPONIN I SERPL HS-MCNC: <3 NG/L
WBC # BLD AUTO: 7.85 X10(3)/MCL (ref 4.5–11.5)

## 2025-08-27 PROCEDURE — 63600175 PHARM REV CODE 636 W HCPCS: Mod: JZ,TB | Performed by: EMERGENCY MEDICINE

## 2025-08-27 PROCEDURE — 96374 THER/PROPH/DIAG INJ IV PUSH: CPT

## 2025-08-27 PROCEDURE — 96361 HYDRATE IV INFUSION ADD-ON: CPT

## 2025-08-27 PROCEDURE — 84484 ASSAY OF TROPONIN QUANT: CPT | Performed by: EMERGENCY MEDICINE

## 2025-08-27 PROCEDURE — 25500020 PHARM REV CODE 255: Performed by: EMERGENCY MEDICINE

## 2025-08-27 PROCEDURE — 80053 COMPREHEN METABOLIC PANEL: CPT | Performed by: EMERGENCY MEDICINE

## 2025-08-27 PROCEDURE — 93005 ELECTROCARDIOGRAM TRACING: CPT

## 2025-08-27 PROCEDURE — 99285 EMERGENCY DEPT VISIT HI MDM: CPT | Mod: 25

## 2025-08-27 PROCEDURE — 96375 TX/PRO/DX INJ NEW DRUG ADDON: CPT

## 2025-08-27 PROCEDURE — 85025 COMPLETE CBC W/AUTO DIFF WBC: CPT | Performed by: EMERGENCY MEDICINE

## 2025-08-27 PROCEDURE — 83735 ASSAY OF MAGNESIUM: CPT | Performed by: EMERGENCY MEDICINE

## 2025-08-27 PROCEDURE — 83690 ASSAY OF LIPASE: CPT | Performed by: EMERGENCY MEDICINE

## 2025-08-27 RX ORDER — KETOROLAC TROMETHAMINE 30 MG/ML
15 INJECTION, SOLUTION INTRAMUSCULAR; INTRAVENOUS
Status: COMPLETED | OUTPATIENT
Start: 2025-08-27 | End: 2025-08-27

## 2025-08-27 RX ORDER — MELOXICAM 7.5 MG/1
7.5 TABLET ORAL 2 TIMES DAILY
Qty: 10 TABLET | Refills: 0 | Status: ON HOLD | OUTPATIENT
Start: 2025-08-27 | End: 2025-08-31 | Stop reason: HOSPADM

## 2025-08-27 RX ORDER — ONDANSETRON HYDROCHLORIDE 2 MG/ML
4 INJECTION, SOLUTION INTRAVENOUS
Status: COMPLETED | OUTPATIENT
Start: 2025-08-27 | End: 2025-08-27

## 2025-08-27 RX ORDER — ONDANSETRON 4 MG/1
4 TABLET, ORALLY DISINTEGRATING ORAL EVERY 6 HOURS PRN
Qty: 20 TABLET | Refills: 0 | Status: ON HOLD | OUTPATIENT
Start: 2025-08-27 | End: 2025-08-31 | Stop reason: HOSPADM

## 2025-08-27 RX ADMIN — KETOROLAC TROMETHAMINE 15 MG: 30 INJECTION, SOLUTION INTRAMUSCULAR; INTRAVENOUS at 12:08

## 2025-08-27 RX ADMIN — IOHEXOL 100 ML: 350 INJECTION, SOLUTION INTRAVENOUS at 01:08

## 2025-08-27 RX ADMIN — SODIUM CHLORIDE, POTASSIUM CHLORIDE, SODIUM LACTATE AND CALCIUM CHLORIDE 1000 ML: 600; 310; 30; 20 INJECTION, SOLUTION INTRAVENOUS at 12:08

## 2025-08-27 RX ADMIN — ONDANSETRON 4 MG: 2 INJECTION INTRAMUSCULAR; INTRAVENOUS at 12:08

## 2025-08-28 LAB
OHS QRS DURATION: 72 MS
OHS QTC CALCULATION: 424 MS

## 2025-08-29 PROBLEM — I10 PRIMARY HYPERTENSION: Chronic | Status: ACTIVE | Noted: 2025-08-29

## 2025-08-29 PROBLEM — K85.90 ACUTE ON CHRONIC PANCREATITIS: Status: ACTIVE | Noted: 2025-08-29

## 2025-08-29 PROBLEM — K86.1 ACUTE ON CHRONIC PANCREATITIS: Status: ACTIVE | Noted: 2025-08-29

## 2025-08-29 PROBLEM — K85.90 ACUTE PANCREATITIS, UNSPECIFIED COMPLICATION STATUS, UNSPECIFIED PANCREATITIS TYPE: Status: ACTIVE | Noted: 2025-08-29

## 2025-08-29 PROBLEM — K86.3 PANCREATIC PSEUDOCYST: Status: ACTIVE | Noted: 2025-08-29

## 2025-09-02 ENCOUNTER — PATIENT OUTREACH (OUTPATIENT)
Dept: ADMINISTRATIVE | Facility: CLINIC | Age: 50
End: 2025-09-02
Payer: COMMERCIAL

## (undated) DEVICE — NEUROSPONGES

## (undated) DEVICE — GLOVE PROTEXIS LTX MICRO  7

## (undated) DEVICE — CLOSURE SKIN STERI STRIP 1/2X4

## (undated) DEVICE — DRESSING NASOPORE FD 8CM

## (undated) DEVICE — MARKER WRITESITE SKIN CHLRAPRP

## (undated) DEVICE — Device

## (undated) DEVICE — SPLINT NASAL EXTERNAL SM

## (undated) DEVICE — GLOVE PROTEXIS LTX MICRO  7.5

## (undated) DEVICE — GLOVE PROTEXIS NEOPRN SZ6.5

## (undated) DEVICE — GLOVE PROTEXIS BLUE LATEX 7.5

## (undated) DEVICE — ADHESIVE MASTISOL VIAL 48/BX

## (undated) DEVICE — MANIFOLD 4 PORT

## (undated) DEVICE — DRAPE ORTH SPLIT 77X108IN